# Patient Record
Sex: FEMALE | Race: WHITE | NOT HISPANIC OR LATINO | Employment: UNEMPLOYED | URBAN - METROPOLITAN AREA
[De-identification: names, ages, dates, MRNs, and addresses within clinical notes are randomized per-mention and may not be internally consistent; named-entity substitution may affect disease eponyms.]

---

## 2017-01-30 ENCOUNTER — OFFICE VISIT (OUTPATIENT)
Dept: INTERNAL MEDICINE | Facility: CLINIC | Age: 47
End: 2017-01-30
Payer: COMMERCIAL

## 2017-01-30 VITALS
DIASTOLIC BLOOD PRESSURE: 80 MMHG | HEIGHT: 63 IN | WEIGHT: 201.25 LBS | TEMPERATURE: 98 F | HEART RATE: 84 BPM | OXYGEN SATURATION: 100 % | SYSTOLIC BLOOD PRESSURE: 139 MMHG | RESPIRATION RATE: 16 BRPM | BODY MASS INDEX: 35.66 KG/M2

## 2017-01-30 DIAGNOSIS — J20.9 ACUTE BRONCHITIS, UNSPECIFIED ORGANISM: Primary | ICD-10-CM

## 2017-01-30 PROCEDURE — 99999 PR PBB SHADOW E&M-EST. PATIENT-LVL IV: CPT | Mod: PBBFAC,,, | Performed by: NURSE PRACTITIONER

## 2017-01-30 PROCEDURE — 1159F MED LIST DOCD IN RCRD: CPT | Mod: S$GLB,,, | Performed by: NURSE PRACTITIONER

## 2017-01-30 PROCEDURE — 99214 OFFICE O/P EST MOD 30 MIN: CPT | Mod: S$GLB,,, | Performed by: NURSE PRACTITIONER

## 2017-01-30 RX ORDER — ALBUTEROL SULFATE 90 UG/1
2 AEROSOL, METERED RESPIRATORY (INHALATION)
Qty: 18 G | Refills: 0 | Status: SHIPPED | OUTPATIENT
Start: 2017-01-30 | End: 2017-12-28 | Stop reason: ALTCHOICE

## 2017-01-30 RX ORDER — PROMETHAZINE HYDROCHLORIDE AND CODEINE PHOSPHATE 6.25; 1 MG/5ML; MG/5ML
5 SOLUTION ORAL EVERY 6 HOURS PRN
Qty: 200 ML | Refills: 0 | Status: SHIPPED | OUTPATIENT
Start: 2017-01-30 | End: 2017-02-09

## 2017-01-30 RX ORDER — PREDNISONE 20 MG/1
40 TABLET ORAL DAILY
Qty: 10 TABLET | Refills: 0 | Status: SHIPPED | OUTPATIENT
Start: 2017-01-30 | End: 2017-02-04

## 2017-01-30 NOTE — PATIENT INSTRUCTIONS
Bronchitis with Wheezing (Viral or Bacterial: Adult)    Bronchitis is an infection of the air passages. It often occurs during the common cold and is usually caused by a virus. Symptoms include cough with mucus (phlegm) and low-grade fever. This illness is contagious during the first few days and is spread through the air by coughing and sneezing, or by direct contact (touching the sick person and then touching your own eyes, nose, or mouth).  If there is a lot of inflammation, air flow is restricted. The air passages may also go into spasm, especially if you have asthma. This causes wheezing and difficulty breathing even in people who do not have asthma.  Bronchitis usually lasts 7 to 14 days. The wheezing should improve with treatment during the first week. An inhaler is often prescribed to relax the air passages and stop wheezing. Antibiotics will be prescribed if your doctor thinks there is also a secondary bacterial infection.  Home care  · If symptoms are severe, rest at home for the first 2 to 3 days. When you go back to your usual activities, don't let yourself get too tired.  · Do not smoke. Also avoid being exposed to secondhand smoke.  · You may use over-the-counter medicine to control fever or pain, unless another medicine was prescribed. Note: If you have chronic liver or kidney disease or have ever had a stomach ulcer or gastrointestinal bleeding, talk with your healthcare provider before using these medicines. Also talk to your provider if you are taking medicine to prevent blood clots.) Aspirin should never be given to anyone younger than 18 years of age who is ill with a viral infection or fever. It may cause severe liver or brain damage.  · Your appetite may be poor, so a light diet is fine. Avoid dehydration by drinking 6 to 8 glasses of fluids per day (such as water, soft drinks, sports drinks, juices, tea, or soup). Extra fluids will help loosen secretions in the nose and  lungs.  · Over-the-counter cough, cold, and sore-throat medicines will not shorten the length of the illness, but they may be helpful to reduce symptoms. (Note: Do not use decongestants if you have high blood pressure.)  · If you were given an inhaler, use it exactly as directed. If you need to use it more often than prescribed, your condition may be worsening. If this happens, contact your healthcare provider.  · If prescribed, finish all antibiotic medicine, even if you are feeling better after only a few days.  Follow-up care  Follow up with your healthcare provider, or as advised. If you had an X-ray or ECG (electrocardiogram), a specialist will review it. You will be notified of any new findings that may affect your care.  Note: If you are age 65 or older, or if you have a chronic lung disease or condition that affects your immune system, or you smoke, talk to your healthcare provider about having a pneumococcal vaccinations and a yearly influenza vaccination (flu shot).  When to seek medical advice  Call your healthcare provider right away if any of these occur:  · Fever of 100.4°F (38°C) or higher  · Coughing up increasing amounts of colored sputum  · Weakness, drowsiness, headache, facial pain, ear pain, or a stiff neck  Call 911, or get immediate medical care  Contact emergency services right away if any of these occur.  · Coughing up blood  · Worsening weakness, drowsiness, headache, or stiff neck  · Increased wheezing not helped with medication, shortness of breath, or pain with breathing  © 1474-6277 [employer]. [address] [place] [address], [place] [address]. All rights reserved. This information is not intended as a substitute for professional medical care. Always follow your healthcare professional's instructions.        Bronchitis, Viral (Adult)    You have a viral bronchitis. Bronchitis is inflammation and swelling of the lining of the lungs. This is often caused by an infection. Symptoms  include a dry, hacking cough that is worse at night. The cough may bring up yellow-green mucus. You may also feel short of breath or wheeze. Other symptoms may include tiredness, chest discomfort, and chills.  Bronchitis that is caused by a virus is not treated with antibiotics. Instead, medicines may be given to help relieve symptoms. Symptoms can last up to 2 weeks, although the cough may last much longer.  This illness is contagious during the first few days and is spread through the air by coughing and sneezing, or by direct contact (touching the sick person and then touching your own eyes, nose, or mouth).  Most viral illnesses resolve within 10 to 14 days with rest and simple home remedies, although they may sometimes last for several weeks.  Home care  · If symptoms are severe, rest at home for the first 2 to 3 days. When you go back to your usual activities, don't let yourself get too tired.  · Do not smoke. Also avoid being exposed to secondhand smoke.  · You may use over-the-counter medicine to control fever or pain, unless another pain medicine was prescribed. (Note: If you have chronic liver or kidney disease or have ever had a stomach ulcer or gastrointestinal bleeding, talk with your healthcare provider before using these medicines. Also talk to your provider if you are taking medicine to prevent blood clots.) Aspirin should never be given to anyone younger than 18 years of age who is ill with a viral infection or fever. It may cause severe liver or brain damage.  · Your appetite may be poor, so a light diet is fine. Avoid dehydration by drinking 6 to 8 glasses of fluids per day (such as water, soft drinks, sports drinks, juices, tea, or soup). Extra fluids will help loosen secretions in the nose and lungs.  · Over-the-counter cough, cold, and sore-throat medicines will not shorten the length of the illness, but they may help to reduce symptoms. (Note: Do not use decongestants if you have high blood  pressure.)  Follow-up care  Follow up with your healthcare provider, or as advised. If you had an X-ray or ECG (electrocardiogram), a specialist will review it. You will be notified of any new findings that may affect your care.  Note: If you are age 65 or older, or if you have a chronic lung disease or condition that affects your immune system, or you smoke, talk to your healthcare provider about having pneumococcal vaccinations and a yearly influenza vaccination (flu shot).  When to seek medical advice  Call your healthcare provider right away if any of these occur:  · Fever of 100.4°F (38°C) or higher  · Coughing up increased amounts of colored sputum  · Weakness, drowsiness, headache, facial pain, ear pain, or a stiff neck  Call 911, or get immediate medical care  Contact emergency services right away if any of these occur:  · Coughing up blood  · Worsening weakness, drowsiness, headache, or stiff neck  · Trouble breathing, wheezing, or pain with breathing  © 9232-5759 [employer]. [address] [place] [address], [place] [address]. All rights reserved. This information is not intended as a substitute for professional medical care. Always follow your healthcare professional's instructions.

## 2017-01-30 NOTE — MR AVS SNAPSHOT
[facility]  [address] [address]  [place] [address]  Phone: [phone]                  Jael Olguin   2017 11:20 AM   Office Visit    Description:  Female : 1970   Provider:  FLAVIO Medel,TONJA-C   Department:  [facility]           Reason for Visit     Cough     Nasal Congestion     Shortness of Breath           Diagnoses this Visit        Comments    Acute bronchitis, unspecified organism    -  Primary            To Do List           Goals (5 Years of Data)     None       These Medications        Disp Refills Start End    predniSONE (DELTASONE) 20 MG tablet 10 tablet 0 2017    Take 2 tablets (40 mg total) by mouth once daily. - Oral    Pharmacy: [address] [id number] [place] Ph #: [phone]       albuterol 90 mcg/actuation inhaler 18 g 0 2017    Inhale 2 puffs into the lungs every 4 to 6 hours as needed for Wheezing. - Inhalation    Pharmacy: [address] [id number] Ph #: [phone]       promethazine-codeine 6.25-10 mg/5 ml (PHENERGAN WITH CODEINE) 6.25-10 mg/5 mL syrup 200 mL 0 2017    Take 5 mLs by mouth every 6 (six) hours as needed for Cough. - Oral    Pharmacy: [address] [address] [place] Ph #: [phone]         [facility]     [facility] -  Assistance  Registered nurses in the [facility] provide clinical advisement, health education, appointment booking, and other advisory services.  Call for this free service at [phone].             Medications           Message regarding Medications     Verify the changes and/or additions to your medication regime listed below are the same as discussed with your clinician today.  If any of these changes or additions are incorrect, please notify your healthcare provider.       "  START taking these NEW medications        Refills    predniSONE (DELTASONE) 20 MG tablet 0    Sig: Take 2 tablets (40 mg total) by mouth once daily.    Class: Normal    Route: Oral    albuterol 90 mcg/actuation inhaler 0    Sig: Inhale 2 puffs into the lungs every 4 to 6 hours as needed for Wheezing.    Class: Normal    Route: Inhalation    promethazine-codeine 6.25-10 mg/5 ml (PHENERGAN WITH CODEINE) 6.25-10 mg/5 mL syrup 0    Sig: Take 5 mLs by mouth every 6 (six) hours as needed for Cough.    Class: Print    Route: Oral      STOP taking these medications     ibuprofen (ADVIL,MOTRIN) 600 MG tablet Take 1 tablet (600 mg total) by mouth every 6 (six) hours as needed for Pain or Temperature greater than (100.4 F).    diclofenac (VOLTAREN) 75 MG EC tablet Take 1 tablet (75 mg total) by mouth 2 (two) times daily.    hydrocodone-acetaminophen 5-325mg (NORCO) 5-325 mg per tablet Take 1 tablet by mouth every 4 (four) hours as needed for Pain.           Verify that the below list of medications is an accurate representation of the medications you are currently taking.  If none reported, the list may be blank. If incorrect, please contact your healthcare provider. Carry this list with you in case of emergency.           Current Medications     albuterol 90 mcg/actuation inhaler Inhale 2 puffs into the lungs every 4 to 6 hours as needed for Wheezing.    predniSONE (DELTASONE) 20 MG tablet Take 2 tablets (40 mg total) by mouth once daily.    promethazine-codeine 6.25-10 mg/5 ml (PHENERGAN WITH CODEINE) 6.25-10 mg/5 mL syrup Take 5 mLs by mouth every 6 (six) hours as needed for Cough.           Clinical Reference Information           Vital Signs - Last Recorded  Most recent update: 1/30/2017 11:30 AM by Alfred Landin MA    BP Pulse Temp Resp    139/80 (BP Location: Right arm, Patient Position: Sitting, BP Method: Automatic) 84 97.5 °F (36.4 °C) (Tympanic) 16    Ht Wt SpO2 BMI    5' 3" (1.6 m) 91.3 kg (201 lb 4.5 oz) " 100% 35.66 kg/m2      Blood Pressure          Most Recent Value    BP  139/80      Allergies as of 1/30/2017     No Known Allergies      Immunizations Administered on Date of Encounter - 1/30/2017     None      Instructions      Bronchitis with Wheezing (Viral or Bacterial: Adult)    Bronchitis is an infection of the air passages. It often occurs during the common cold and is usually caused by a virus. Symptoms include cough with mucus (phlegm) and low-grade fever. This illness is contagious during the first few days and is spread through the air by coughing and sneezing, or by direct contact (touching the sick person and then touching your own eyes, nose, or mouth).  If there is a lot of inflammation, air flow is restricted. The air passages may also go into spasm, especially if you have asthma. This causes wheezing and difficulty breathing even in people who do not have asthma.  Bronchitis usually lasts 7 to 14 days. The wheezing should improve with treatment during the first week. An inhaler is often prescribed to relax the air passages and stop wheezing. Antibiotics will be prescribed if your doctor thinks there is also a secondary bacterial infection.  Home care  · If symptoms are severe, rest at home for the first 2 to 3 days. When you go back to your usual activities, don't let yourself get too tired.  · Do not smoke. Also avoid being exposed to secondhand smoke.  · You may use over-the-counter medicine to control fever or pain, unless another medicine was prescribed. Note: If you have chronic liver or kidney disease or have ever had a stomach ulcer or gastrointestinal bleeding, talk with your healthcare provider before using these medicines. Also talk to your provider if you are taking medicine to prevent blood clots.) Aspirin should never be given to anyone younger than 18 years of age who is ill with a viral infection or fever. It may cause severe liver or brain damage.  · Your appetite may be poor, so a  light diet is fine. Avoid dehydration by drinking 6 to 8 glasses of fluids per day (such as water, soft drinks, sports drinks, juices, tea, or soup). Extra fluids will help loosen secretions in the nose and lungs.  · Over-the-counter cough, cold, and sore-throat medicines will not shorten the length of the illness, but they may be helpful to reduce symptoms. (Note: Do not use decongestants if you have high blood pressure.)  · If you were given an inhaler, use it exactly as directed. If you need to use it more often than prescribed, your condition may be worsening. If this happens, contact your healthcare provider.  · If prescribed, finish all antibiotic medicine, even if you are feeling better after only a few days.  Follow-up care  Follow up with your healthcare provider, or as advised. If you had an X-ray or ECG (electrocardiogram), a specialist will review it. You will be notified of any new findings that may affect your care.  Note: If you are age 65 or older, or if you have a chronic lung disease or condition that affects your immune system, or you smoke, talk to your healthcare provider about having a pneumococcal vaccinations and a yearly influenza vaccination (flu shot).  When to seek medical advice  Call your healthcare provider right away if any of these occur:  · Fever of 100.4°F (38°C) or higher  · Coughing up increasing amounts of colored sputum  · Weakness, drowsiness, headache, facial pain, ear pain, or a stiff neck  Call 911, or get immediate medical care  Contact emergency services right away if any of these occur.  · Coughing up blood  · Worsening weakness, drowsiness, headache, or stiff neck  · Increased wheezing not helped with medication, shortness of breath, or pain with breathing  © 2687-0404 [employer]. [address] [place] [address], [place] [address]. All rights reserved. This information is not intended as a substitute for professional medical care. Always follow your healthcare  professional's instructions.        Bronchitis, Viral (Adult)    You have a viral bronchitis. Bronchitis is inflammation and swelling of the lining of the lungs. This is often caused by an infection. Symptoms include a dry, hacking cough that is worse at night. The cough may bring up yellow-green mucus. You may also feel short of breath or wheeze. Other symptoms may include tiredness, chest discomfort, and chills.  Bronchitis that is caused by a virus is not treated with antibiotics. Instead, medicines may be given to help relieve symptoms. Symptoms can last up to 2 weeks, although the cough may last much longer.  This illness is contagious during the first few days and is spread through the air by coughing and sneezing, or by direct contact (touching the sick person and then touching your own eyes, nose, or mouth).  Most viral illnesses resolve within 10 to 14 days with rest and simple home remedies, although they may sometimes last for several weeks.  Home care  · If symptoms are severe, rest at home for the first 2 to 3 days. When you go back to your usual activities, don't let yourself get too tired.  · Do not smoke. Also avoid being exposed to secondhand smoke.  · You may use over-the-counter medicine to control fever or pain, unless another pain medicine was prescribed. (Note: If you have chronic liver or kidney disease or have ever had a stomach ulcer or gastrointestinal bleeding, talk with your healthcare provider before using these medicines. Also talk to your provider if you are taking medicine to prevent blood clots.) Aspirin should never be given to anyone younger than 18 years of age who is ill with a viral infection or fever. It may cause severe liver or brain damage.  · Your appetite may be poor, so a light diet is fine. Avoid dehydration by drinking 6 to 8 glasses of fluids per day (such as water, soft drinks, sports drinks, juices, tea, or soup). Extra fluids will help loosen secretions in the nose  and lungs.  · Over-the-counter cough, cold, and sore-throat medicines will not shorten the length of the illness, but they may help to reduce symptoms. (Note: Do not use decongestants if you have high blood pressure.)  Follow-up care  Follow up with your healthcare provider, or as advised. If you had an X-ray or ECG (electrocardiogram), a specialist will review it. You will be notified of any new findings that may affect your care.  Note: If you are age 65 or older, or if you have a chronic lung disease or condition that affects your immune system, or you smoke, talk to your healthcare provider about having pneumococcal vaccinations and a yearly influenza vaccination (flu shot).  When to seek medical advice  Call your healthcare provider right away if any of these occur:  · Fever of 100.4°F (38°C) or higher  · Coughing up increased amounts of colored sputum  · Weakness, drowsiness, headache, facial pain, ear pain, or a stiff neck  Call 911, or get immediate medical care  Contact emergency services right away if any of these occur:  · Coughing up blood  · Worsening weakness, drowsiness, headache, or stiff neck  · Trouble breathing, wheezing, or pain with breathing  © 6818-9804 [employer]. [address] [place] [address], [place] [address]. All rights reserved. This information is not intended as a substitute for professional medical care. Always follow your healthcare professional's instructions.             Smoking Cessation     If you would like to quit smoking:   You may be eligible for free services if you are a [place] resident and started smoking cigarettes before September 1, 1988.  Call the Smoking Cessation Trust (SCT) toll free at (503) 329-7714 or (940) 476-7645.   Call 2-800-QUIT-NOW if you do not meet the above criteria.

## 2017-01-30 NOTE — LETTER
January 30, 2017                 [facility]  Internal Medicine  [address] [place] [id number]  Phone: [phone]   January 30, 2017     Patient: Jael Olguin   YOB: 1970   Date of Visit: 1/30/2017       To Whom it May Concern:    Jael Olguin was seen in my clinic on 1/30/2017. She may return to work on 2/1/2017.    If you have any questions or concerns, please don't hesitate to call.    Sincerely,         FLAVIO Medel,FNP-C

## 2017-01-30 NOTE — PROGRESS NOTES
Subjective:       Patient ID: Jael Olguin is a 46 y.o. female.    Chief Complaint: Cough; Nasal Congestion; and Shortness of Breath    Cough   This is a new problem. The current episode started yesterday. The problem has been rapidly worsening. The cough is non-productive. Associated symptoms include headaches (frontal), myalgias, nasal congestion, postnasal drip, rhinorrhea, shortness of breath and wheezing. Pertinent negatives include no chest pain, chills, ear congestion, ear pain, fever, rash, sore throat or sweats. Nothing aggravates the symptoms. Treatments tried: mucinex multipsymptom. The treatment provided mild relief. Her past medical history is significant for asthma and bronchitis. There is no history of COPD.     Review of Systems   Constitutional: Positive for appetite change. Negative for activity change, chills, diaphoresis, fatigue and fever.   HENT: Positive for congestion, postnasal drip and rhinorrhea. Negative for ear discharge, ear pain, sinus pressure, sneezing and sore throat.    Respiratory: Positive for cough, shortness of breath and wheezing.    Cardiovascular: Negative for chest pain and palpitations.   Gastrointestinal: Negative for abdominal pain, diarrhea, nausea and vomiting.   Musculoskeletal: Positive for myalgias.   Skin: Negative for rash.   Neurological: Positive for headaches (frontal). Negative for dizziness, weakness and light-headedness.   Hematological: Negative for adenopathy.       Objective:      Physical Exam   Constitutional: She is oriented to person, place, and time. She appears well-developed and well-nourished.   HENT:   Head: Normocephalic and atraumatic.   Right Ear: Hearing, tympanic membrane, external ear and ear canal normal.   Left Ear: Hearing, tympanic membrane, external ear and ear canal normal.   Nose: Mucosal edema and rhinorrhea present. Right sinus exhibits no maxillary sinus tenderness and no frontal sinus tenderness. Left sinus  exhibits no maxillary sinus tenderness and no frontal sinus tenderness.   Mouth/Throat: Uvula is midline. Posterior oropharyngeal erythema (mild) present. No oropharyngeal exudate or posterior oropharyngeal edema. No tonsillar exudate.   Eyes: Conjunctivae and EOM are normal. Pupils are equal, round, and reactive to light. Right eye exhibits no discharge. Left eye exhibits no discharge.   Neck: Normal range of motion. Neck supple.   Cardiovascular: Normal rate, regular rhythm, normal heart sounds and intact distal pulses.    Pulmonary/Chest: Effort normal. She has no decreased breath sounds. She has wheezes (intermittent, expiratory).   Abdominal: Soft. Bowel sounds are normal. She exhibits no distension. There is no tenderness.   Lymphadenopathy:     She has no cervical adenopathy.   Neurological: She is alert and oriented to person, place, and time.   Skin: Skin is warm and dry.   Psychiatric: She has a normal mood and affect. Her behavior is normal. Judgment and thought content normal.   Vitals reviewed.      Assessment:       1. Acute bronchitis, unspecified organism        Plan:       *Acute bronchitis, unspecified organism  Discussed supportive home care including rest, hydration, hot fluids with honey and tylenol/motrin for sore throat and body aches. Handout given. Pt verbalizes understanding.  F/u if not improving over the next 5 days. Will consider ABX at that time  -     predniSONE (DELTASONE) 20 MG tablet; Take 2 tablets (40 mg total) by mouth once daily.  Dispense: 10 tablet; Refill: 0  -     albuterol 90 mcg/actuation inhaler; Inhale 2 puffs into the lungs every 4 to 6 hours as needed for Wheezing.  Dispense: 18 g; Refill: 0  -     promethazine-codeine 6.25-10 mg/5 ml (PHENERGAN WITH CODEINE) 6.25-10 mg/5 mL syrup; Take 5 mLs by mouth every 6 (six) hours as needed for Cough.  Dispense: 200 mL; Refill: 0    **

## 2017-04-15 ENCOUNTER — HOSPITAL ENCOUNTER (EMERGENCY)
Facility: HOSPITAL | Age: 47
Discharge: HOME | End: 2017-04-15
Payer: COMMERCIAL

## 2017-04-15 VITALS
HEART RATE: 102 BPM | RESPIRATION RATE: 16 BRPM | TEMPERATURE: 98 F | WEIGHT: 190 LBS | BODY MASS INDEX: 32.44 KG/M2 | HEIGHT: 64 IN | DIASTOLIC BLOOD PRESSURE: 76 MMHG | OXYGEN SATURATION: 99 % | SYSTOLIC BLOOD PRESSURE: 145 MMHG

## 2017-04-15 DIAGNOSIS — M54.50 CHRONIC MIDLINE LOW BACK PAIN WITHOUT SCIATICA: Primary | ICD-10-CM

## 2017-04-15 DIAGNOSIS — G89.29 CHRONIC MIDLINE LOW BACK PAIN WITHOUT SCIATICA: Primary | ICD-10-CM

## 2017-04-15 PROCEDURE — 99283 EMERGENCY DEPT VISIT LOW MDM: CPT

## 2017-04-15 RX ORDER — HYDROCODONE BITARTRATE AND ACETAMINOPHEN 5; 325 MG/1; MG/1
1 TABLET ORAL EVERY 4 HOURS PRN
Qty: 15 TABLET | Refills: 0 | Status: SHIPPED | OUTPATIENT
Start: 2017-04-15 | End: 2017-05-23

## 2017-04-15 RX ORDER — CYCLOBENZAPRINE HCL 10 MG
10 TABLET ORAL 3 TIMES DAILY PRN
Qty: 15 TABLET | Refills: 0 | Status: SHIPPED | OUTPATIENT
Start: 2017-04-15 | End: 2017-04-20

## 2017-04-15 NOTE — ED PROVIDER NOTES
Encounter Date: 4/15/2017       History     Chief Complaint   Patient presents with    Back Pain     lower back pain since yesterday, denies injury/trauma, denies urinary/bowel problems      Review of patient's allergies indicates:  No Known Allergies  HPI Comments: Pt reports to ED c/o low back pain.  Pt says she has had chronic back pain, but says she had an acute flare that started yesterday.  Pt says she has a hx of bulging disk.  Pt says pain is to her midback and feels like someone is punching her in the back. Pt reports no relief with with motrin 800.  Pt says pain is worse with leaning back and laying down. Pain is constant and does not radiate.  Pt denies any trauma, saddle anesthesia, bladder or bowel incontinence, fever, nausea, vomiting, numbness, tingling, or any other associated sxs.      The history is provided by the patient.     Past Medical History:   Diagnosis Date    Depression      Past Surgical History:   Procedure Laterality Date    NO PAST SURGERIES       History reviewed. No pertinent family history.  Social History   Substance Use Topics    Smoking status: Current Every Day Smoker     Packs/day: 0.50     Years: 40.00     Types: Cigarettes    Smokeless tobacco: Never Used    Alcohol use No     Review of Systems   Constitutional: Negative for fever.   HENT: Negative for sore throat.    Respiratory: Negative for shortness of breath.    Cardiovascular: Negative for chest pain.   Gastrointestinal: Negative for diarrhea, nausea and vomiting.   Genitourinary: Negative for dysuria and hematuria.   Musculoskeletal: Positive for back pain. Negative for gait problem and joint swelling.   Skin: Negative for rash and wound.   Neurological: Negative for weakness.   Hematological: Does not bruise/bleed easily.   All other systems reviewed and are negative.      Physical Exam   Initial Vitals   BP Pulse Resp Temp SpO2   04/15/17 1629 04/15/17 1629 04/15/17 1629 04/15/17 1629 04/15/17 1629   145/76  102 16 98 °F (36.7 °C) 99 %     Physical Exam    Nursing note and vitals reviewed.  Constitutional: She appears well-developed and well-nourished.   HENT:   Head: Normocephalic and atraumatic.   Eyes: EOM are normal. Pupils are equal, round, and reactive to light.   Neck: Normal range of motion. Neck supple.   Cardiovascular: Normal rate and regular rhythm.   Pulmonary/Chest: Breath sounds normal.   Abdominal: Soft.   Musculoskeletal: Normal range of motion. She exhibits tenderness (diffuse low back tenderness). She exhibits no edema.   No bony deformities or step-offs   Neurological: She is alert and oriented to person, place, and time. She displays normal reflexes. No sensory deficit.         ED Course   Procedures  Labs Reviewed - No data to display                            ED Course     Clinical Impression:   The encounter diagnosis was Chronic midline low back pain without sciatica.          XAVIER Rowell  04/15/17 4383

## 2017-04-15 NOTE — ED AVS SNAPSHOT
[facility]  [address] [address] [address]               Jael Olguin   4/15/2017  4:34 PM   ED    Description:  Female : 1970   Department:  [facility]           Your Care was Coordinated By:     Provider Role From To    XAVIER Rowell Physician Assistant 04/15/17 8174 --      Reason for Visit     Back Pain           Diagnoses this Visit        Comments    Chronic midline low back pain without sciatica    -  Primary       ED Disposition     None           To Do List           Follow-up Information     Follow up with Franck Pham MD In 2 days.    Specialty:  Pain Medicine    Why:  As needed, If symptoms worsen return to ED     Contact information:    [address] [facility] [address]         These Medications        Disp Refills Start End    hydrocodone-acetaminophen 5-325mg (NORCO) 5-325 mg per tablet 15 tablet 0 4/15/2017     Take 1 tablet by mouth every 4 (four) hours as needed for Pain. - Oral    Pharmacy: [facility] - [place], [place] - [address] Ph #: [phone]       cyclobenzaprine (FLEXERIL) 10 MG tablet 15 tablet 0 4/15/2017 2017    Take 1 tablet (10 mg total) by mouth 3 (three) times daily as needed for Muscle spasms. - Oral    Pharmacy: [facility] - [place], [place] - [address] Ph #: [phone]         [facility]     [facility] Care [facility] -  Assistance  Unless otherwise directed by your provider, please contact [facility], our nurse care line that is available for  assistance.     Registered nurses in the [facility] provide: appointment scheduling, clinical advisement, health education, and other advisory services.  Call: [phone] (toll free)               Medications           Message regarding Medications     Verify the changes and/or additions to your medication regime  "listed below are the same as discussed with your clinician today.  If any of these changes or additions are incorrect, please notify your healthcare provider.        START taking these NEW medications        Refills    hydrocodone-acetaminophen 5-325mg (NORCO) 5-325 mg per tablet 0    Sig: Take 1 tablet by mouth every 4 (four) hours as needed for Pain.    Class: Print    Route: Oral    cyclobenzaprine (FLEXERIL) 10 MG tablet 0    Sig: Take 1 tablet (10 mg total) by mouth 3 (three) times daily as needed for Muscle spasms.    Class: Print    Route: Oral           Verify that the below list of medications is an accurate representation of the medications you are currently taking.  If none reported, the list may be blank. If incorrect, please contact your healthcare provider. Carry this list with you in case of emergency.           Current Medications     albuterol 90 mcg/actuation inhaler Inhale 2 puffs into the lungs every 4 to 6 hours as needed for Wheezing.    cyclobenzaprine (FLEXERIL) 10 MG tablet Take 1 tablet (10 mg total) by mouth 3 (three) times daily as needed for Muscle spasms.    hydrocodone-acetaminophen 5-325mg (NORCO) 5-325 mg per tablet Take 1 tablet by mouth every 4 (four) hours as needed for Pain.           Clinical Reference Information           Your Vitals Were     BP Pulse Temp Resp Height Weight    145/76 (BP Location: Left arm, Patient Position: Sitting) 102 98 °F (36.7 °C) (Oral) 16 5' 4" (1.626 m) 86.2 kg (190 lb)    Last Period SpO2 BMI          04/07/2017 99% 32.61 kg/m2        Allergies as of 4/15/2017     No Known Allergies      Immunizations Administered on Date of Encounter - 4/15/2017     None      ED Micro, Lab, POCT     None      ED Imaging Orders     None        Discharge Instructions         Back Care Tips    Caring for your back  These are things you can do to prevent a recurrence of acute back pain and to reduce symptoms from chronic back pain:  · Maintain a healthy weight. If you " are overweight, losing weight will help most types of back pain.  · Exercise is an important part of recovery from most types of back pain. The muscles behind and in front of the spine support the back. This means strengthening both the back muscles and the abdominal muscles will provide better support for your spine.   · Swimming and brisk walking are good overall exercises to improve your fitness level.  · Practice safe lifting methods (below).  · Practice good posture when sitting, standing and walking. Avoid prolonged sitting. This puts more stress on the lower back than standing or walking.  · Wear quality shoes with sufficient arch support. Foot and ankle alignment can affect back symptoms. Women should avoid wearing high heels.  · Therapeutic massage can help relax the back muscles without stretching them.  · During the first 24 to 72 hours after an acute injury or flare-up of chronic back pain, apply an ice pack to the painful area for 20 minutes and then remove it for 20 minutes, over a period of 60 to 90 minutes, or several times a day. As a safety precaution, do not use a heating pad at bedtime. Sleeping on a heating pad can lead to skin burns or tissue damage.  · You can alternate ice and heat therapies.  Medications  Talk to your healthcare provider before using medicines, especially if you have other medical problems or are taking other medicines.  · You may use acetaminophen or ibuprofen to control pain, unless your healthcare provider prescribed other pain medicine. If you have chronic conditions like diabetes, liver or kidney disease, stomach ulcers, or gastrointestinal bleeding, or are taking blood thinners, talk with your healthcare provider before taking any medicines.  · Be careful if you are given prescription pain medicines, narcotics, or medicine for muscle spasm. They can cause drowsiness, affect your coordination, reflexes, and judgment. Do not drive or operate heavy machinery while taking  these types of medicines. Take prescription pain medicine only as prescribed by your healthcare provider.  Lumbar stretch  Here is a simple stretching exercise that will help relax muscle spasm and keep your back more limber. If exercise makes your back pain worse, dont do it.  · Lie on your back with your knees bent and both feet on the ground.  · Slowly raise your left knee to your chest as you flatten your lower back against the floor. Hold for 5 seconds.  · Relax and repeat the exercise with your right knee.  · Do 10 of these exercises for each leg.  Safe lifting method  · Dont bend over at the waist to lift an object off the floor.  Instead, bend your knees and hips in a squat.   · Keep your back and head upright  · Hold the object close to your body, directly in front of you.  · Straighten your legs to lift the object.   · Lower the object to the floor in the reverse fashion.  · If you must slide something across the floor, push it.  Posture tips  Sitting  Sit in chairs with straight backs or low-back support. Keep your knees lower than your hips, with your feet flat on the floor.  When driving, sit up straight. Adjust the seat forward so you are not leaning toward the steering wheel.  A small pillow or rolled towel behind your lower back may help if you are driving long distances.   Standing  When standing for long periods, shift most of your weight to one leg at a time. Alternate legs every few minutes.   Sleeping  The best way to sleep is on your side with your knees bent. Put a low pillow under your head to support your neck in a neutral spine position. Avoid thick pillows that bend your neck to one side. Put a pillow between your legs to further relax your lower back. If you sleep on your back, put pillows under your knees to support your legs in a slightly flexed position. Use a firm mattress. If your mattress sags, replace it, or use a 1/2-inch plywood board under the mattress to add  support.  Follow-up care  Follow up with your healthcare provider, or as advised.  If X-rays, a CT scan or an MRI scan were taken, they will be reviewed by a radiologist. You will be notified of any new findings that may affect your care.  Call 911  Seek emergency medical care if any of the following occur:  · Trouble breathing  · Confusion  · Very drowsy  · Fainting or loss of consciousness  · Rapid or very slow heart rate  · Loss of  bowel or bladder control  When to seek medical care  Call your healthcare provider if any of the following occur:  · Pain becomes worse or spreads to your arms or legs  · Weakness or numbness in one or both arms or legs  · Numbness in the groin area  Date Last Reviewed: 6/1/2016 © 2000-2016 [employer]. [address] [place] [address]. All rights reserved. This information is not intended as a substitute for professional medical care. Always follow your healthcare professional's instructions.          Smoking Cessation     If you would like to quit smoking:   You may be eligible for free services if you are a [place] resident and started smoking cigarettes before September 1, 1988.  Call the Smoking Cessation Trust (SCT) toll free at (453) 249-9668 or (164) 894-0670.   Call 1-800-QUIT-NOW if you do not meet the above criteria.   Contact us via email: [email or web address]   View our website for more information: [email or web address]         [facility] complies with applicable Federal civil rights laws and does not discriminate on the basis of race, color, national origin, age, disability, or sex.        Language Assistance Services     ATTENTION: Language assistance services are available, free of charge. Please call [phone].      ATENCIÓN: Si habla español, tiene a moraes disposición servicios gratuitos de asistencia lingüística. Llame al [phone].     CHÚ Ý: N?u b?n nói Ti?ng Vi?t, có các d?ch v? h? tr? ngôn ng?  mi?n phí dành cho b?n. G?i s? [phone].

## 2017-04-15 NOTE — DISCHARGE INSTRUCTIONS
Back Care Tips    Caring for your back  These are things you can do to prevent a recurrence of acute back pain and to reduce symptoms from chronic back pain:  · Maintain a healthy weight. If you are overweight, losing weight will help most types of back pain.  · Exercise is an important part of recovery from most types of back pain. The muscles behind and in front of the spine support the back. This means strengthening both the back muscles and the abdominal muscles will provide better support for your spine.   · Swimming and brisk walking are good overall exercises to improve your fitness level.  · Practice safe lifting methods (below).  · Practice good posture when sitting, standing and walking. Avoid prolonged sitting. This puts more stress on the lower back than standing or walking.  · Wear quality shoes with sufficient arch support. Foot and ankle alignment can affect back symptoms. Women should avoid wearing high heels.  · Therapeutic massage can help relax the back muscles without stretching them.  · During the first 24 to 72 hours after an acute injury or flare-up of chronic back pain, apply an ice pack to the painful area for 20 minutes and then remove it for 20 minutes, over a period of 60 to 90 minutes, or several times a day. As a safety precaution, do not use a heating pad at bedtime. Sleeping on a heating pad can lead to skin burns or tissue damage.  · You can alternate ice and heat therapies.  Medications  Talk to your healthcare provider before using medicines, especially if you have other medical problems or are taking other medicines.  · You may use acetaminophen or ibuprofen to control pain, unless your healthcare provider prescribed other pain medicine. If you have chronic conditions like diabetes, liver or kidney disease, stomach ulcers, or gastrointestinal bleeding, or are taking blood thinners, talk with your healthcare provider before taking any medicines.  · Be careful if you are given  prescription pain medicines, narcotics, or medicine for muscle spasm. They can cause drowsiness, affect your coordination, reflexes, and judgment. Do not drive or operate heavy machinery while taking these types of medicines. Take prescription pain medicine only as prescribed by your healthcare provider.  Lumbar stretch  Here is a simple stretching exercise that will help relax muscle spasm and keep your back more limber. If exercise makes your back pain worse, dont do it.  · Lie on your back with your knees bent and both feet on the ground.  · Slowly raise your left knee to your chest as you flatten your lower back against the floor. Hold for 5 seconds.  · Relax and repeat the exercise with your right knee.  · Do 10 of these exercises for each leg.  Safe lifting method  · Dont bend over at the waist to lift an object off the floor.  Instead, bend your knees and hips in a squat.   · Keep your back and head upright  · Hold the object close to your body, directly in front of you.  · Straighten your legs to lift the object.   · Lower the object to the floor in the reverse fashion.  · If you must slide something across the floor, push it.  Posture tips  Sitting  Sit in chairs with straight backs or low-back support. Keep your knees lower than your hips, with your feet flat on the floor.  When driving, sit up straight. Adjust the seat forward so you are not leaning toward the steering wheel.  A small pillow or rolled towel behind your lower back may help if you are driving long distances.   Standing  When standing for long periods, shift most of your weight to one leg at a time. Alternate legs every few minutes.   Sleeping  The best way to sleep is on your side with your knees bent. Put a low pillow under your head to support your neck in a neutral spine position. Avoid thick pillows that bend your neck to one side. Put a pillow between your legs to further relax your lower back. If you sleep on your back, put pillows  under your knees to support your legs in a slightly flexed position. Use a firm mattress. If your mattress sags, replace it, or use a 1/2-inch plywood board under the mattress to add support.  Follow-up care  Follow up with your healthcare provider, or as advised.  If X-rays, a CT scan or an MRI scan were taken, they will be reviewed by a radiologist. You will be notified of any new findings that may affect your care.  Call 911  Seek emergency medical care if any of the following occur:  · Trouble breathing  · Confusion  · Very drowsy  · Fainting or loss of consciousness  · Rapid or very slow heart rate  · Loss of  bowel or bladder control  When to seek medical care  Call your healthcare provider if any of the following occur:  · Pain becomes worse or spreads to your arms or legs  · Weakness or numbness in one or both arms or legs  · Numbness in the groin area  Date Last Reviewed: 6/1/2016  © 1145-6011 [employer]. [address] [place] [address], [place] [address]. All rights reserved. This information is not intended as a substitute for professional medical care. Always follow your healthcare professional's instructions.

## 2017-04-15 NOTE — ED NOTES
Pt stable, in NAD, and states no further needs at this time. PA aware of vitals ok to d/c.  Pt to be d/c'd home.

## 2017-05-23 ENCOUNTER — OFFICE VISIT (OUTPATIENT)
Dept: INTERNAL MEDICINE | Facility: CLINIC | Age: 47
End: 2017-05-23
Payer: COMMERCIAL

## 2017-05-23 VITALS
HEIGHT: 64 IN | RESPIRATION RATE: 16 BRPM | DIASTOLIC BLOOD PRESSURE: 71 MMHG | HEART RATE: 81 BPM | WEIGHT: 183.63 LBS | SYSTOLIC BLOOD PRESSURE: 114 MMHG | BODY MASS INDEX: 31.35 KG/M2 | TEMPERATURE: 98 F | OXYGEN SATURATION: 99 %

## 2017-05-23 DIAGNOSIS — A08.4 GASTROENTERITIS AND COLITIS, VIRAL: Primary | ICD-10-CM

## 2017-05-23 DIAGNOSIS — Z12.31 ENCOUNTER FOR SCREENING MAMMOGRAM FOR BREAST CANCER: ICD-10-CM

## 2017-05-23 PROCEDURE — 1160F RVW MEDS BY RX/DR IN RCRD: CPT | Mod: S$GLB,,, | Performed by: FAMILY MEDICINE

## 2017-05-23 PROCEDURE — 99999 PR PBB SHADOW E&M-EST. PATIENT-LVL III: CPT | Mod: PBBFAC,,, | Performed by: FAMILY MEDICINE

## 2017-05-23 PROCEDURE — 99214 OFFICE O/P EST MOD 30 MIN: CPT | Mod: S$GLB,,, | Performed by: FAMILY MEDICINE

## 2017-05-23 RX ORDER — CYCLOBENZAPRINE HCL 10 MG
10 TABLET ORAL 3 TIMES DAILY PRN
Qty: 30 TABLET | Refills: 1 | Status: SHIPPED | OUTPATIENT
Start: 2017-05-23 | End: 2017-06-02

## 2017-05-23 RX ORDER — NAPROXEN 500 MG/1
500 TABLET ORAL 2 TIMES DAILY
Qty: 14 TABLET | Refills: 1 | Status: SHIPPED | OUTPATIENT
Start: 2017-05-23 | End: 2017-05-30

## 2017-05-23 NOTE — PATIENT INSTRUCTIONS

## 2017-05-23 NOTE — PROGRESS NOTES
"Subjective:       Patient ID: Jael Olguin is a 46 y.o. female.    Chief Complaint: Nausea; Diarrhea; Low-back Pain; and Hip Pain (rosy)    Nausea   This is a new problem. The current episode started in the past 7 days. The problem has been gradually improving. Associated symptoms include arthralgias, a change in bowel habit, nausea and vomiting. Pertinent negatives include no chest pain, congestion, coughing, diaphoresis, fever, rash or urinary symptoms. The symptoms are aggravated by eating. Treatments tried: imodium.   Diarrhea    This is a new problem. The current episode started in the past 7 days. The problem has been gradually improving. Associated symptoms include arthralgias and vomiting. Pertinent negatives include no coughing or fever.       History reviewed. No pertinent family history.    Current Outpatient Prescriptions:     albuterol 90 mcg/actuation inhaler, Inhale 2 puffs into the lungs every 4 to 6 hours as needed for Wheezing., Disp: 18 g, Rfl: 0    cyclobenzaprine (FLEXERIL) 10 MG tablet, Take 1 tablet (10 mg total) by mouth 3 (three) times daily as needed for Muscle spasms., Disp: 30 tablet, Rfl: 1    naproxen (EC NAPROSYN) 500 MG EC tablet, Take 1 tablet (500 mg total) by mouth 2 (two) times daily., Disp: 14 tablet, Rfl: 1    Review of Systems   Constitutional: Negative for diaphoresis and fever.   HENT: Negative for congestion.    Respiratory: Negative for cough.    Cardiovascular: Negative for chest pain.   Gastrointestinal: Positive for change in bowel habit, diarrhea, nausea and vomiting.   Musculoskeletal: Positive for arthralgias and back pain (SI joint area).   Skin: Negative for rash.       Objective:   /71 (BP Location: Left arm, Patient Position: Sitting, BP Method: Automatic)   Pulse 81   Temp 97.8 °F (36.6 °C) (Tympanic)   Resp 16   Ht 5' 4" (1.626 m)   Wt 83.3 kg (183 lb 10.3 oz)   LMP 05/03/2017   SpO2 99%   BMI 31.52 kg/m²      Physical Exam "   Constitutional: She is oriented to person, place, and time. She appears well-developed and well-nourished.   HENT:   Head: Normocephalic and atraumatic.   Eyes: Conjunctivae are normal.   Cardiovascular: Normal rate.    Pulmonary/Chest: Effort normal. No respiratory distress.   Musculoskeletal: She exhibits no edema.        Back:    Neurological: She is alert and oriented to person, place, and time. Coordination normal.   Skin: Skin is warm and dry. No rash noted.   Psychiatric: She has a normal mood and affect. Her behavior is normal.   Vitals reviewed.      Assessment & Plan     1. Gastroenteritis and colitis, viral  Symptoms are consistent with gastroenteritis.  Overall she is doing better and seems that she has some reactive arthritis secondary to the viral infection with her SI joints tender and a feeling of hip soreness.  I reassured her and recommended that she try naproxen for relief.  Continue hydration and gradual advancement of diet.    2. Encounter for screening mammogram for breast cancer  Needs initial screening.  Her mother was just diagnosed with breast cancer.  Her mother is 66.  - Mammo Digital Screening Bilateral With CAD; Future    She will follow up for further health maintenance issues.

## 2017-06-17 ENCOUNTER — HOSPITAL ENCOUNTER (EMERGENCY)
Facility: HOSPITAL | Age: 47
Discharge: HOME | End: 2017-06-17
Payer: COMMERCIAL

## 2017-06-17 VITALS
TEMPERATURE: 98 F | HEART RATE: 114 BPM | BODY MASS INDEX: 31.89 KG/M2 | HEIGHT: 63 IN | WEIGHT: 180 LBS | OXYGEN SATURATION: 98 % | SYSTOLIC BLOOD PRESSURE: 163 MMHG | DIASTOLIC BLOOD PRESSURE: 98 MMHG | RESPIRATION RATE: 18 BRPM

## 2017-06-17 DIAGNOSIS — M54.50 BACK PAIN, LUMBOSACRAL: ICD-10-CM

## 2017-06-17 DIAGNOSIS — S30.0XXA CONTUSION OF LOWER BACK, INITIAL ENCOUNTER: ICD-10-CM
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
## 2017-06-17 PROCEDURE — 99283 EMERGENCY DEPT VISIT LOW MDM: CPT

## 2017-06-17 RX ORDER — CYCLOBENZAPRINE HCL 10 MG
10 TABLET ORAL 3 TIMES DAILY PRN
Qty: 20 TABLET | Refills: 0 | Status: SHIPPED | OUTPATIENT
Start: 2017-06-17 | End: 2017-06-17

## 2017-06-17 RX ORDER — CYCLOBENZAPRINE HCL 10 MG
10 TABLET ORAL 3 TIMES DAILY PRN
Qty: 20 TABLET | Refills: 0 | Status: SHIPPED | OUTPATIENT
Start: 2017-06-17 | End: 2017-07-17

## 2017-06-17 RX ORDER — HYDROCODONE BITARTRATE AND ACETAMINOPHEN 5; 325 MG/1; MG/1
1 TABLET ORAL EVERY 6 HOURS PRN
Qty: 10 TABLET | Refills: 0 | Status: SHIPPED | OUTPATIENT
Start: 2017-06-17 | End: 2017-06-27

## 2017-06-17 NOTE — ED PROVIDER NOTES
Encounter Date: 6/17/2017       History     Chief Complaint   Patient presents with    Back Pain     reports fell and hit back     Review of patient's allergies indicates:  No Known Allergies  Pt is a 45 yo female with PMHx of chronic back pain. She does have an MRI of back scheduled for this coming week. In the meantime, she has taken a fall and reports constant pain to lumbosacral area that radiates to the left buttock. Pain improves with standing. Has taken Aleve and Flexeril with minimal improvement. Pt was stepping off porch onto a ladder and slid down ladder landing on buttock area. Pt is driving herself.       The history is provided by the patient.   Fall   The accident occurred two days ago. The fall occurred from a ladder. She fell from a height of 3 to 5 ft. She landed on dirt. There was no blood loss. The point of impact was the left hip (lumbosacral area). The pain is present in the back (Lumbosacral area pain radiating to left buttock). The pain is at a severity of 7/10 (asssociated with muscle spasm). She was ambulatory at the scene. There was no entrapment after the fall. There was drug use involved in the accident. There was no alcohol use involved in the accident. Associated symptoms include back pain. Pertinent negatives include no neck pain, no paresthesias, no paralysis, no fever, no numbness, no abdominal pain, no bowel incontinence, no nausea, no vomiting, no hematuria and no loss of consciousness. The symptoms are aggravated by activity. She has tried NSAIDs for the symptoms. The treatment provided mild relief.     Past Medical History:   Diagnosis Date    Depression      Past Surgical History:   Procedure Laterality Date    NO PAST SURGERIES       No family history on file.  Social History   Substance Use Topics    Smoking status: Current Every Day Smoker     Packs/day: 0.50     Years: 40.00     Types: Cigarettes    Smokeless tobacco: Never Used    Alcohol use No     Review of Systems    Constitutional: Negative for activity change, appetite change and fever.   HENT: Negative for congestion and sore throat.    Eyes: Negative for redness and visual disturbance.   Respiratory: Negative for cough and shortness of breath.    Cardiovascular: Negative for chest pain.   Gastrointestinal: Negative for abdominal pain, bowel incontinence, nausea and vomiting.   Genitourinary: Negative for dysuria and hematuria.   Musculoskeletal: Positive for back pain. Negative for gait problem (antalgic gait) and neck pain.   Skin: Negative for rash.   Neurological: Negative for loss of consciousness, weakness, numbness and paresthesias.   Hematological: Does not bruise/bleed easily.       Physical Exam     Initial Vitals [06/17/17 1337]   BP Pulse Resp Temp SpO2   (!) 163/98 (!) 114 18 98.3 °F (36.8 °C) 98 %     Physical Exam    Nursing note and vitals reviewed.  Constitutional: She appears well-developed and well-nourished.  Non-toxic appearance.   Ambulatory with antalgic gait   HENT:   Head: Normocephalic and atraumatic.   Mouth/Throat: No oropharyngeal exudate.   Eyes: Conjunctivae and lids are normal.   Neck: Normal range of motion. Neck supple.   Cardiovascular: Normal rate, regular rhythm and normal heart sounds.   Pulmonary/Chest: Breath sounds normal. She has no wheezes. She has no rhonchi. She has no rales.   Musculoskeletal: Normal range of motion. She exhibits no edema.        Lumbar back: She exhibits tenderness. She exhibits normal range of motion, no bony tenderness, no swelling and no edema.        Back:    Lymphadenopathy:     She has no cervical adenopathy.   Neurological: She is alert and oriented to person, place, and time. She has normal strength.   Skin: Skin is warm and dry. No rash noted. No pallor.   Psychiatric: She has a normal mood and affect. Thought content normal.         ED Course   Procedures  Labs Reviewed - No data to display                  Fall, accidental, initial encounter    Back  pain, lumbosacral    Contusion of lower back, initial encounter    Other orders  -     hydrocodone-acetaminophen 5-325mg (NORCO) 5-325 mg per tablet; Take 1 tablet by mouth every 6 (six) hours as needed for Pain.  Dispense: 10 tablet; Refill: 0  -     Discontinue: cyclobenzaprine (FLEXERIL) 10 MG tablet; Take 1 tablet (10 mg total) by mouth 3 (three) times daily as needed for Muscle spasms.  Dispense: 20 tablet; Refill: 0  -     cyclobenzaprine (FLEXERIL) 10 MG tablet; Take 1 tablet (10 mg total) by mouth 3 (three) times daily as needed for Muscle spasms.  Dispense: 20 tablet; Refill: 0       Rest the back  Warm heating pad  Topical warming creams          ED Course     Clinical Impression:   The primary encounter diagnosis was Fall, accidental, initial encounter. Diagnoses of Back pain, lumbosacral and Contusion of lower back, initial encounter were also pertinent to this visit.    Follow-up Information     Chris Bolaños DO in 2 days.    Specialty:  Family Medicine  Contact information:  [address] [address] [address]  [phone]                       Nupur Hennessy NP  06/17/17 4815

## 2017-08-24 ENCOUNTER — HOSPITAL ENCOUNTER (EMERGENCY)
Facility: HOSPITAL | Age: 47
Discharge: HOME | End: 2017-08-24
Attending: EMERGENCY MEDICINE
Payer: COMMERCIAL

## 2017-08-24 VITALS
DIASTOLIC BLOOD PRESSURE: 81 MMHG | BODY MASS INDEX: 31.18 KG/M2 | TEMPERATURE: 98 F | OXYGEN SATURATION: 100 % | RESPIRATION RATE: 18 BRPM | HEIGHT: 63 IN | HEART RATE: 104 BPM | WEIGHT: 176 LBS | SYSTOLIC BLOOD PRESSURE: 174 MMHG

## 2017-08-24 DIAGNOSIS — K08.89 TOOTHACHE: Primary | ICD-10-CM

## 2017-08-24 DIAGNOSIS — K04.7 DENTAL ABSCESS: ICD-10-CM

## 2017-08-24 PROCEDURE — 99283 EMERGENCY DEPT VISIT LOW MDM: CPT

## 2017-08-24 RX ORDER — HYDROCODONE BITARTRATE AND ACETAMINOPHEN 5; 325 MG/1; MG/1
1 TABLET ORAL EVERY 4 HOURS PRN
Qty: 11 TABLET | Refills: 0 | Status: SHIPPED | OUTPATIENT
Start: 2017-08-24 | End: 2017-09-03

## 2017-08-24 RX ORDER — PENICILLIN V POTASSIUM 500 MG/1
500 TABLET, FILM COATED ORAL 4 TIMES DAILY
Qty: 40 TABLET | Refills: 0 | Status: SHIPPED | OUTPATIENT
Start: 2017-08-24 | End: 2017-08-31

## 2017-08-24 NOTE — ED PROVIDER NOTES
SCRIBE #1 NOTE: I, Lebron Mosqueda, am scribing for, and in the presence of, Vineet Mejia MD. I have scribed the entire note.      History      Chief Complaint   Patient presents with    Dental Pain     pt reports possible filling cracked or came out       Review of patient's allergies indicates:  No Known Allergies     HPI   HPI    8/24/2017, 4:11 PM   History obtained from the patient      History of Present Illness: Jael Olguin is a 47 y.o. female patient who presents to the Emergency Department for left sided toothache which onset suddenly yesterday. Pt reports she believes a filling may have come out. Symptoms are constant and moderate in severity. Exacerbated by nothing and relieved by nothing. Patient denies any fever, chills, difficulty swallowing, SOB, drooling, jaw pain, n/v, and all other sxs at this time. No further complaints or concerns at this time.     Arrival mode: Personal vehicle    PCP: Chris Bolaños DO       Past Medical History:  Past Medical History:   Diagnosis Date    Depression        Past Surgical History:  Past Surgical History:   Procedure Laterality Date    NO PAST SURGERIES           Family History:  No family history on file.    Social History:  Social History     Social History Main Topics    Smoking status: Current Every Day Smoker     Packs/day: 0.50     Years: 40.00     Types: Cigarettes    Smokeless tobacco: Never Used    Alcohol use No    Drug use: No    Sexual activity: Yes     Partners: Male       ROS   Review of Systems   Constitutional: Negative for chills and fever.   HENT: Positive for dental problem and facial swelling. Negative for congestion, sore throat and trouble swallowing.    Respiratory: Negative for cough and shortness of breath.    Cardiovascular: Negative for chest pain.   Gastrointestinal: Negative for nausea and vomiting.   Genitourinary: Negative for dysuria and hematuria.   Musculoskeletal: Negative for back pain, neck  "pain and neck stiffness.   Skin: Negative for rash.   Neurological: Negative for dizziness, weakness and light-headedness.   Hematological: Does not bruise/bleed easily.     Physical Exam      Initial Vitals [08/24/17 1601]   BP Pulse Resp Temp SpO2   (!) 174/81 104 18 98.4 °F (36.9 °C) 100 %      MAP       112          Physical Exam  Nursing Notes and Vital Signs Reviewed.  Constitutional: Patient is in no acute distress. Well-developed and well-nourished.  Head: Atraumatic. Normocephalic.  Eyes: PERRL. EOM intact. Conjunctivae are not pale. No scleral icterus.  ENT: Mucous membranes are moist. Oropharynx is clear and symmetric. Left upper toothache noted.    Neck: Supple. Full ROM. No lymphadenopathy.  Cardiovascular: Regular rate. Regular rhythm.  Pulmonary/Chest: No respiratory distress.   Abdominal: Soft and non-distended.    Musculoskeletal: Moves all extremities. No obvious deformities. No edema. No calf tenderness.  Skin: Warm and dry.  Neurological:  Alert, awake, and appropriate.  Normal speech.  No acute focal neurological deficits are appreciated.  Psychiatric: Normal affect. Good eye contact. Appropriate in content.    ED Course    Procedures  ED Vital Signs:  Vitals:    08/24/17 1601   BP: (!) 174/81   Pulse: 104   Resp: 18   Temp: 98.4 °F (36.9 °C)   TempSrc: Oral   SpO2: 100%   Weight: 79.8 kg (176 lb)   Height: 5' 3" (1.6 m)          The Emergency Provider reviewed the vital signs and test results, which are outlined above.    ED Discussion     4:11 PM: Reassessed pt at this time. Pt is awake, alert, and in NAD. Discussed with pt all pertinent ED information. Discussed pt dx of toothache and plan of tx. Gave pt all f/u and return to the ED instructions. All questions and concerns were addressed at this time. Pt expresses understanding of information and instructions, and is comfortable with plan to discharge. Pt is stable for discharge.    I discussed with patient and/or family/caretaker that " evaluation in the ED does not suggest any emergent or life threatening medical conditions requiring immediate intervention beyond what was provided in the ED, and I believe patient is safe for discharge.  Regardless, an unremarkable evaluation in the ED does not preclude the development or presence of a serious of life threatening condition. As such, patient was instructed to return immediately for any worsening or change in current symptoms.      ED Medication(s):  Medications - No data to display    Discharge Medication List as of 8/24/2017  4:13 PM      START taking these medications    Details   hydrocodone-acetaminophen 5-325mg (NORCO) 5-325 mg per tablet Take 1 tablet by mouth every 4 (four) hours as needed for Pain., Starting Thu 8/24/2017, Until Sun 9/3/2017, Print      penicillin v potassium (VEETID) 500 MG tablet Take 1 tablet (500 mg total) by mouth 4 (four) times daily., Starting Thu 8/24/2017, Until Thu 8/31/2017, Print             Follow-up Information     dentist In 2 days.                   Medical Decision Making              Scribe Attestation:   Scribe #1: I performed the above scribed service and the documentation accurately describes the services I performed. I attest to the accuracy of the note.    Attending:   Physician Attestation Statement for Scribe #1: I, Vineet Mejia MD, personally performed the services described in this documentation, as scribed by Lebron Mosqueda, in my presence, and it is both accurate and complete.          Clinical Impression       ICD-10-CM ICD-9-CM   1. Toothache K08.89 525.9   2. Dental abscess K04.7 522.5       Disposition:   Disposition: Discharged  Condition: Stable         Vineet Mejia MD  08/24/17 9393

## 2017-10-21 ENCOUNTER — HOSPITAL ENCOUNTER (EMERGENCY)
Facility: HOSPITAL | Age: 47
Discharge: HOME | End: 2017-10-21
Payer: COMMERCIAL

## 2017-10-21 VITALS
BODY MASS INDEX: 30.16 KG/M2 | RESPIRATION RATE: 18 BRPM | WEIGHT: 170.19 LBS | TEMPERATURE: 98 F | OXYGEN SATURATION: 100 % | SYSTOLIC BLOOD PRESSURE: 132 MMHG | HEIGHT: 63 IN | HEART RATE: 98 BPM | DIASTOLIC BLOOD PRESSURE: 77 MMHG

## 2017-10-21 DIAGNOSIS — M43.6 TORTICOLLIS: Primary | ICD-10-CM

## 2017-10-21 PROCEDURE — 99283 EMERGENCY DEPT VISIT LOW MDM: CPT

## 2017-10-21 RX ORDER — DICLOFENAC SODIUM 75 MG/1
75 TABLET, DELAYED RELEASE ORAL 2 TIMES DAILY
Qty: 20 TABLET | Refills: 0 | Status: SHIPPED | OUTPATIENT
Start: 2017-10-21 | End: 2017-12-28 | Stop reason: ALTCHOICE

## 2017-10-21 RX ORDER — METHYLPREDNISOLONE 4 MG/1
TABLET ORAL
Qty: 1 PACKAGE | Refills: 0 | Status: SHIPPED | OUTPATIENT
Start: 2017-10-21 | End: 2017-12-28 | Stop reason: ALTCHOICE

## 2017-10-21 RX ORDER — METHOCARBAMOL 500 MG/1
1000 TABLET, FILM COATED ORAL 3 TIMES DAILY
Qty: 30 TABLET | Refills: 0 | Status: SHIPPED | OUTPATIENT
Start: 2017-10-21 | End: 2017-10-26

## 2017-10-21 NOTE — ED PROVIDER NOTES
Encounter Date: 10/21/2017       History     Chief Complaint   Patient presents with    Neck Pain     pt c/o R-sided neck pain that radiates down R arm, x4 days, denies trauma/injury     The history is provided by the patient.   Neck Pain    This is a new problem. The current episode started several days ago. The problem occurs occasionally. The problem has been unchanged. The pain is associated with twisting. The pain is present in the right side. The quality of the pain is described as shooting. The pain radiates to the right shoulder. The pain is at a severity of 3/10. The symptoms are aggravated by twisting and position. Pertinent negatives include no photophobia, no visual change, no chest pain, no syncope, no numbness, no weight loss, no headaches, no bowel incontinence, no bladder incontinence, no leg pain, no paresis, no tingling and no weakness. She has tried nothing for the symptoms.     Review of patient's allergies indicates:  No Known Allergies  Past Medical History:   Diagnosis Date    Depression      Past Surgical History:   Procedure Laterality Date    NO PAST SURGERIES       No family history on file.  Social History   Substance Use Topics    Smoking status: Current Every Day Smoker     Packs/day: 0.50     Years: 40.00     Types: Cigarettes    Smokeless tobacco: Never Used    Alcohol use No     Review of Systems   Constitutional: Negative for chills, fever and weight loss.   HENT: Negative for sore throat.    Eyes: Negative for photophobia and redness.   Respiratory: Negative for cough and shortness of breath.    Cardiovascular: Negative for chest pain and syncope.   Gastrointestinal: Negative for abdominal pain, bowel incontinence, diarrhea and nausea.   Endocrine: Negative for polydipsia and polyphagia.   Genitourinary: Negative for bladder incontinence and dysuria.   Musculoskeletal: Positive for neck pain. Negative for arthralgias, back pain and myalgias.   Skin: Negative for rash.    Neurological: Negative for tingling, weakness, numbness and headaches.   Hematological: Does not bruise/bleed easily.   Psychiatric/Behavioral: The patient is not nervous/anxious.    All other systems reviewed and are negative.      Physical Exam     Initial Vitals [10/21/17 1021]   BP Pulse Resp Temp SpO2   132/77 98 18 97.9 °F (36.6 °C) 100 %      MAP       95.33         Physical Exam    Nursing note and vitals reviewed.  Constitutional: Vital signs are normal. She appears well-developed and well-nourished. No distress.   HENT:   Head: Normocephalic and atraumatic.   Right Ear: External ear normal.   Left Ear: External ear normal.   Nose: Nose normal.   Mouth/Throat: Oropharynx is clear and moist.   Eyes: Conjunctivae, EOM and lids are normal. Pupils are equal, round, and reactive to light.   Neck: Normal range of motion and full passive range of motion without pain. Neck supple.   Cardiovascular: Normal rate, regular rhythm, S1 normal, S2 normal, normal heart sounds, intact distal pulses and normal pulses.   Pulmonary/Chest: Breath sounds normal. No respiratory distress. She has no wheezes. She has no rales.   Abdominal: Soft. Normal appearance and bowel sounds are normal. She exhibits no distension. There is no tenderness.   Musculoskeletal:        Cervical back: She exhibits decreased range of motion, tenderness, pain and spasm. She exhibits no bony tenderness, no swelling, no edema, no deformity, no laceration and normal pulse.        Back:    Lymphadenopathy:     She has no cervical adenopathy.   Neurological: She is alert and oriented to person, place, and time. She has normal strength. No cranial nerve deficit or sensory deficit. Coordination and gait normal.   Skin: Skin is warm, dry and intact.   Psychiatric: She has a normal mood and affect. Her speech is normal and behavior is normal. Judgment and thought content normal. Cognition and memory are normal.         ED Course   Procedures  Labs Reviewed -  No data to display                       Attending Attestation:     Physician Attestation Statement for NP/PA:   I discussed this assessment and plan of this patient with the NP/PA, but I did not personally examine the patient. The face to face encounter was performed by the NP/PA.                  ED Course      Clinical Impression:   The encounter diagnosis was Torticollis.    Disposition:   Disposition: Discharged  Condition: Stable                        XAVIER Hernandez  10/21/17 1105       XAVIER Hernandez  10/21/17 1105       Gal Manning MD  10/21/17 5641

## 2017-12-28 ENCOUNTER — OFFICE VISIT (OUTPATIENT)
Dept: INTERNAL MEDICINE | Facility: CLINIC | Age: 47
End: 2017-12-28
Payer: COMMERCIAL

## 2017-12-28 VITALS
BODY MASS INDEX: 30.9 KG/M2 | SYSTOLIC BLOOD PRESSURE: 124 MMHG | WEIGHT: 174.38 LBS | DIASTOLIC BLOOD PRESSURE: 78 MMHG | TEMPERATURE: 96 F | OXYGEN SATURATION: 98 % | RESPIRATION RATE: 18 BRPM | HEART RATE: 85 BPM | HEIGHT: 63 IN

## 2017-12-28 DIAGNOSIS — J06.9 VIRAL URI: Primary | ICD-10-CM

## 2017-12-28 PROCEDURE — 99999 PR PBB SHADOW E&M-EST. PATIENT-LVL III: CPT | Mod: PBBFAC,,, | Performed by: FAMILY MEDICINE

## 2017-12-28 PROCEDURE — 99214 OFFICE O/P EST MOD 30 MIN: CPT | Mod: S$GLB,,, | Performed by: FAMILY MEDICINE

## 2017-12-28 NOTE — ASSESSMENT & PLAN NOTE
Symptoms and exam are consistent with viral infection. No need for antibiotics at this time. Advised continued supportive care with rest and hydration plus/minus OTC medications. Stressed importance of hand hygiene and avoiding the sharing of cups and utensils to keep from spreading germs to those around them.  If symptoms worsen, follow up should be arranged.

## 2017-12-28 NOTE — PROGRESS NOTES
"Subjective:       Patient ID: Jael Olguin is a 47 y.o. female.    Chief Complaint: Otalgia (for two days) and Sore Throat    Sore Throat    This is a new problem. The current episode started in the past 7 days. The problem has been waxing and waning. There has been no fever. The pain is mild. Associated symptoms include ear pain (took some tylenol). Pertinent negatives include no abdominal pain, congestion, coughing, drooling, ear discharge, hoarse voice, shortness of breath, stridor, swollen glands, trouble swallowing or vomiting. She has had no exposure to strep. She has tried acetaminophen for the symptoms. The treatment provided mild relief.       No family history on file.  No current outpatient prescriptions on file.    Review of Systems   Constitutional: Negative for chills and fever.   HENT: Positive for ear pain (took some tylenol), postnasal drip and sore throat. Negative for congestion, drooling, ear discharge, hoarse voice and trouble swallowing.    Respiratory: Negative for cough, shortness of breath and stridor.    Cardiovascular: Negative for chest pain.   Gastrointestinal: Negative for abdominal pain and vomiting.   Skin: Negative for rash.   Neurological: Negative for dizziness.       Objective:   /78 (BP Location: Right arm, Patient Position: Sitting, BP Method: Medium (Manual))   Pulse 85   Temp 96.2 °F (35.7 °C) (Tympanic)   Resp 18   Ht 5' 3" (1.6 m)   Wt 79.1 kg (174 lb 6.1 oz)   LMP 12/25/2017   SpO2 98%   BMI 30.89 kg/m²      Physical Exam   Constitutional: She is oriented to person, place, and time. She appears well-developed and well-nourished.   HENT:   Head: Normocephalic and atraumatic.   Eyes: Conjunctivae are normal.   Cardiovascular: Normal rate.    Pulmonary/Chest: Effort normal. No respiratory distress.   Musculoskeletal: She exhibits no edema.   Neurological: She is alert and oriented to person, place, and time. Coordination normal.   Skin: Skin " is warm and dry. No rash noted.   Psychiatric: She has a normal mood and affect. Her behavior is normal.   Vitals reviewed.      Assessment & Plan   Viral URI  Symptoms and exam are consistent with viral infection. No need for antibiotics at this time. Advised continued supportive care with rest and hydration plus/minus OTC medications. Stressed importance of hand hygiene and avoiding the sharing of cups and utensils to keep from spreading germs to those around them.  If symptoms worsen, follow up should be arranged.      Recommend using zyrtec d or claritin d along with flonase to help with symptoms.     No Follow-up on file.

## 2018-04-26 ENCOUNTER — HOSPITAL ENCOUNTER (EMERGENCY)
Facility: HOSPITAL | Age: 48
Discharge: HOME | End: 2018-04-26
Payer: COMMERCIAL

## 2018-04-26 VITALS
RESPIRATION RATE: 20 BRPM | TEMPERATURE: 96 F | OXYGEN SATURATION: 96 % | DIASTOLIC BLOOD PRESSURE: 79 MMHG | HEIGHT: 64 IN | BODY MASS INDEX: 29.2 KG/M2 | HEART RATE: 96 BPM | SYSTOLIC BLOOD PRESSURE: 183 MMHG | WEIGHT: 171.06 LBS

## 2018-04-26 DIAGNOSIS — Z72.0 TOBACCO USE: ICD-10-CM

## 2018-04-26 DIAGNOSIS — K08.89 PAIN, DENTAL: Primary | ICD-10-CM

## 2018-04-26 DIAGNOSIS — R03.0 ELEVATED BLOOD PRESSURE READING: ICD-10-CM

## 2018-04-26 PROCEDURE — 99283 EMERGENCY DEPT VISIT LOW MDM: CPT

## 2018-04-26 RX ORDER — PENICILLIN V POTASSIUM 500 MG/1
500 TABLET, FILM COATED ORAL 4 TIMES DAILY
Qty: 40 TABLET | Refills: 0 | Status: SHIPPED | OUTPATIENT
Start: 2018-04-26 | End: 2018-05-06

## 2018-04-26 RX ORDER — TRAMADOL HYDROCHLORIDE 50 MG/1
50 TABLET ORAL EVERY 6 HOURS PRN
Qty: 10 TABLET | Refills: 0 | Status: SHIPPED | OUTPATIENT
Start: 2018-04-26 | End: 2018-05-29

## 2018-04-26 NOTE — ED PROVIDER NOTES
"   History      Chief Complaint   Patient presents with    Dental Pain     "my filing came out"       Review of patient's allergies indicates:  No Known Allergies     HPI   HPI    4/26/2018, 3:01 PM   History obtained from the patient      History of Present Illness: Jael Olguin is a 47 y.o. female patient who presents to the Emergency Department for dental pain for 2 days. She says her filling broke off.  Denies f/n/v.  Symptoms are constant and moderate in severity.  No mitigating or exacerbating factors reported.   No further complaints or concerns at this time.           PCP: Chris Bolaños DO       Past Medical History:  Past Medical History:   Diagnosis Date    Depression          Past Surgical History:  Past Surgical History:   Procedure Laterality Date    NO PAST SURGERIES             Family History:  No family history on file.        Social History:  Social History     Social History Main Topics    Smoking status: Current Every Day Smoker     Packs/day: 0.50     Years: 40.00     Types: Cigarettes    Smokeless tobacco: Never Used    Alcohol use No    Drug use: No    Sexual activity: Yes     Partners: Male       ROS   Constitutional: Negative for chills and fever.   HENT: Positive for dental problem. Negative for sore throat.    Respiratory: Negative for chest tightness and shortness of breath.    Cardiovascular: Negative for chest pain.   Gastrointestinal: Negative for nausea and vomiting.   Genitourinary: Negative for dysuria.   Musculoskeletal: Negative for back pain.   Skin: Negative for pallor and rash.   Neurological: Negative for weakness.   Hematological: Does not bruise/bleed easily.   All other systems reviewed and are negative.  Review of Systems    Physical Exam      Initial Vitals [04/26/18 1428]   BP Pulse Resp Temp SpO2   (!) 183/79 96 20 96 °F (35.6 °C) 96 %      MAP       113.67         Physical Exam  Vital signs and nursing notes reviewed.  Constitutional: " "Patient is in NAD. Awake and alert. Well-developed and well-nourished.  Head: Atraumatic. Normocephalic.  Eyes: PERRL. EOM intact. Conjunctivae nl. No scleral icterus.  ENT: Mucous membranes are moist. Oropharynx is clear.  gingival ttp, mild erythema.  +dental caries.  No facial edema  Neck: Supple. No JVD. No lymphadenopathy.  No meningismus  Cardiovascular: Regular rate and rhythm. No murmurs, rubs, or gallops. Distal pulses are 2+ and symmetric.  Pulmonary/Chest: No respiratory distress. Clear to auscultation bilaterally. No wheezing, rales, or rhonchi.  Abdominal: Soft. Non-distended. No TTP. No rebound, guarding, or rigidity. Good bowel sounds.  Genitourinary: No CVA tenderness  Musculoskeletal: Moves all extremities. No edema.   Skin: Warm and dry.  Neurological: Awake and alert. No acute focal neurological deficits are appreciated.  Psychiatric: Normal affect. Good eye contact. Appropriate in content.      ED Course      Procedures  ED Vital Signs:  Vitals:    04/26/18 1428   BP: (!) 183/79   Pulse: 96   Resp: 20   Temp: 96 °F (35.6 °C)   TempSrc: Oral   SpO2: 96%   Weight: 77.6 kg (171 lb 1.2 oz)   Height: 5' 3.5" (1.613 m)                 Imaging Results:  Imaging Results    None            The Emergency Provider reviewed the vital signs and test results, which are outlined above.    ED Discussion         All findings were reviewed with the patient/family in detail.  Findings seem to be most consistent with a diagnosis of dental pain and dental caries.  GAVE PT LIST OF DENTAL RESOURCES.  DISCUSSED POTENTIAL COMPLICATIONS IF PT DOES NOT FOLLOW UP WITH DENTIST.  All remaining questions and concerns were addressed at that time.  Patient/family has been counseled regarding the need for follow-up as well as the indication to return to the emergency room should new or worrisome developments occur.    Pre-hypertension/Hypertension: The pt has been informed that they may have pre-hypertension or hypertension based " on a blood pressure reading in the ED. I recommend that the pt call the PCP listed on their discharge instructions or a physician of their choice this week to arrange f/u for further evaluation of possible pre-hypertension or hypertension.         Medication(s) given in the ER:  Medications - No data to display        Follow-up Information     Your Dentist In 2 days.                     New Prescriptions    PENICILLIN V POTASSIUM (VEETID) 500 MG TABLET    Take 1 tablet (500 mg total) by mouth 4 (four) times daily.    TRAMADOL (ULTRAM) 50 MG TABLET    Take 1 tablet (50 mg total) by mouth every 6 (six) hours as needed for Pain.          Medical Decision Making              MDM   Additional MDM:   Hypertension: The patient has hypertension (no treatment required at this time). The patient's condition was felt to be stable.   Smoking Cessation: The patient was counseled on tobacco related  health complications. Appropriate patient literature was given to the patient concerning tobacco cessation.              Clinical Impression:        ICD-10-CM ICD-9-CM   1. Pain, dental K08.89 525.9   2. Elevated blood pressure reading R03.0 796.2   3. Tobacco use Z72.0 305.1              Disposition  Stable  Discharged     Flor Cardona PA-C  04/26/18 3898

## 2018-04-26 NOTE — ED NOTES
Pt examined by Flor PARK  without RN, educated on prescriptions, given discharge instructions and discharged to [facility]. See provider notes for exam.

## 2018-05-29 ENCOUNTER — HOSPITAL ENCOUNTER (OUTPATIENT)
Dept: RADIOLOGY | Facility: HOSPITAL | Age: 48
Discharge: HOME | End: 2018-05-29
Attending: PHYSICIAN ASSISTANT
Payer: COMMERCIAL

## 2018-05-29 ENCOUNTER — OFFICE VISIT (OUTPATIENT)
Dept: INTERNAL MEDICINE | Facility: CLINIC | Age: 48
End: 2018-05-29
Payer: COMMERCIAL

## 2018-05-29 VITALS
WEIGHT: 173.31 LBS | BODY MASS INDEX: 29.59 KG/M2 | TEMPERATURE: 98 F | SYSTOLIC BLOOD PRESSURE: 132 MMHG | HEIGHT: 64 IN | DIASTOLIC BLOOD PRESSURE: 86 MMHG | HEART RATE: 83 BPM | RESPIRATION RATE: 16 BRPM | OXYGEN SATURATION: 99 %

## 2018-05-29 DIAGNOSIS — M79.2 INTRACTABLE CERVICAL NEUROPATHIC PAIN: Primary | ICD-10-CM

## 2018-05-29 DIAGNOSIS — M25.512 ACUTE PAIN OF LEFT SHOULDER: ICD-10-CM

## 2018-05-29 DIAGNOSIS — M79.2 INTRACTABLE CERVICAL NEUROPATHIC PAIN: ICD-10-CM
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
## 2018-05-29 PROBLEM — E66.811 OBESITY, CLASS I, BMI 30-34.9: Status: ACTIVE | Noted: 2018-05-29

## 2018-05-29 PROBLEM — E66.9 OBESITY, CLASS I, BMI 30-34.9: Status: ACTIVE | Noted: 2018-05-29

## 2018-05-29 PROCEDURE — 73030 X-RAY EXAM OF SHOULDER: CPT | Mod: TC,FY,PO,LT

## 2018-05-29 PROCEDURE — 3008F BODY MASS INDEX DOCD: CPT | Mod: CPTII,S$GLB,, | Performed by: PHYSICIAN ASSISTANT

## 2018-05-29 PROCEDURE — 72050 X-RAY EXAM NECK SPINE 4/5VWS: CPT | Mod: 26,,, | Performed by: RADIOLOGY

## 2018-05-29 PROCEDURE — 99999 PR PBB SHADOW E&M-EST. PATIENT-LVL IV: CPT | Mod: PBBFAC,,, | Performed by: PHYSICIAN ASSISTANT

## 2018-05-29 PROCEDURE — 72050 X-RAY EXAM NECK SPINE 4/5VWS: CPT | Mod: TC,FY,PO

## 2018-05-29 PROCEDURE — 99213 OFFICE O/P EST LOW 20 MIN: CPT | Mod: 25,S$GLB,, | Performed by: PHYSICIAN ASSISTANT

## 2018-05-29 PROCEDURE — 96372 THER/PROPH/DIAG INJ SC/IM: CPT | Mod: S$GLB,,, | Performed by: PEDIATRICS

## 2018-05-29 PROCEDURE — 73030 X-RAY EXAM OF SHOULDER: CPT | Mod: 26,LT,, | Performed by: RADIOLOGY

## 2018-05-29 RX ORDER — KETOROLAC TROMETHAMINE 30 MG/ML
60 INJECTION, SOLUTION INTRAMUSCULAR; INTRAVENOUS
Status: COMPLETED | OUTPATIENT
Start: 2018-05-29 | End: 2018-05-29

## 2018-05-29 RX ORDER — MELOXICAM 15 MG/1
15 TABLET ORAL DAILY
Qty: 20 TABLET | Refills: 0 | Status: SHIPPED | OUTPATIENT
Start: 2018-05-29 | End: 2018-06-19

## 2018-05-29 RX ORDER — TRAMADOL HYDROCHLORIDE 50 MG/1
50 TABLET ORAL
Qty: 40 TABLET | Refills: 0 | Status: SHIPPED | OUTPATIENT
Start: 2018-05-29 | End: 2018-06-08

## 2018-05-29 RX ORDER — IBUPROFEN 800 MG/1
800 TABLET ORAL
COMMUNITY
End: 2020-05-01

## 2018-05-29 RX ADMIN — KETOROLAC TROMETHAMINE 60 MG: 30 INJECTION, SOLUTION INTRAMUSCULAR; INTRAVENOUS at 11:05

## 2018-05-29 NOTE — PROGRESS NOTES
Pt exhibited more mobility in left arm after Toradol injection, pt reported less stiffness and 8/0-10 scale pain LE 30min post injection. Pt advised to call clinic PRN, take tramadol rx as directed, and d/c from visit.

## 2018-07-27 DIAGNOSIS — Z12.39 BREAST CANCER SCREENING: ICD-10-CM

## 2018-10-12 ENCOUNTER — PATIENT MESSAGE (OUTPATIENT)
Dept: INTERNAL MEDICINE | Facility: CLINIC | Age: 48
End: 2018-10-12

## 2018-10-28 ENCOUNTER — HOSPITAL ENCOUNTER (EMERGENCY)
Facility: HOSPITAL | Age: 48
Discharge: HOME | End: 2018-10-28
Attending: EMERGENCY MEDICINE

## 2018-10-28 VITALS
HEART RATE: 70 BPM | OXYGEN SATURATION: 100 % | HEIGHT: 64 IN | WEIGHT: 169.56 LBS | BODY MASS INDEX: 28.95 KG/M2 | DIASTOLIC BLOOD PRESSURE: 73 MMHG | RESPIRATION RATE: 20 BRPM | TEMPERATURE: 98 F | SYSTOLIC BLOOD PRESSURE: 128 MMHG

## 2018-10-28 DIAGNOSIS — R19.7 DIARRHEA, UNSPECIFIED TYPE: Primary | ICD-10-CM

## 2018-10-28 DIAGNOSIS — R10.9 ABDOMINAL PAIN, UNSPECIFIED ABDOMINAL LOCATION: ICD-10-CM

## 2018-10-28 LAB
ALBUMIN SERPL BCP-MCNC: 4.3 G/DL
ALP SERPL-CCNC: 62 U/L
ALT SERPL W/O P-5'-P-CCNC: 12 U/L
ANION GAP SERPL CALC-SCNC: 14 MMOL/L
AST SERPL-CCNC: 15 U/L
BACTERIA #/AREA URNS AUTO: NORMAL /HPF
BASOPHILS # BLD AUTO: 0.05 K/UL
BASOPHILS NFR BLD: 0.4 %
BILIRUB SERPL-MCNC: 0.4 MG/DL
BILIRUB UR QL STRIP: NEGATIVE
BUN SERPL-MCNC: 15 MG/DL
CALCIUM SERPL-MCNC: 10.2 MG/DL
CHLORIDE SERPL-SCNC: 106 MMOL/L
CLARITY UR REFRACT.AUTO: CLEAR
CO2 SERPL-SCNC: 22 MMOL/L
COLOR UR AUTO: YELLOW
CREAT SERPL-MCNC: 0.8 MG/DL
DIFFERENTIAL METHOD: ABNORMAL
EOSINOPHIL # BLD AUTO: 0 K/UL
EOSINOPHIL NFR BLD: 0.1 %
ERYTHROCYTE [DISTWIDTH] IN BLOOD BY AUTOMATED COUNT: 14.1 %
EST. GFR  (AFRICAN AMERICAN): >60 ML/MIN/1.73 M^2
EST. GFR  (NON AFRICAN AMERICAN): >60 ML/MIN/1.73 M^2
GLUCOSE SERPL-MCNC: 94 MG/DL
GLUCOSE UR QL STRIP: NEGATIVE
HCT VFR BLD AUTO: 43.8 %
HGB BLD-MCNC: 15 G/DL
HGB UR QL STRIP: ABNORMAL
KETONES UR QL STRIP: ABNORMAL
LEUKOCYTE ESTERASE UR QL STRIP: ABNORMAL
LIPASE SERPL-CCNC: 15 U/L
LYMPHOCYTES # BLD AUTO: 2.4 K/UL
LYMPHOCYTES NFR BLD: 17.3 %
MCH RBC QN AUTO: 30.1 PG
MCHC RBC AUTO-ENTMCNC: 34.2 G/DL
MCV RBC AUTO: 88 FL
MICROSCOPIC COMMENT: NORMAL
MONOCYTES # BLD AUTO: 0.7 K/UL
MONOCYTES NFR BLD: 4.8 %
NEUTROPHILS # BLD AUTO: 10.7 K/UL
NEUTROPHILS NFR BLD: 77.3 %
NITRITE UR QL STRIP: NEGATIVE
PH UR STRIP: 6 [PH] (ref 5–8)
PLATELET # BLD AUTO: 473 K/UL
PMV BLD AUTO: 9.3 FL
POTASSIUM SERPL-SCNC: 3.6 MMOL/L
PROT SERPL-MCNC: 8.2 G/DL
PROT UR QL STRIP: NEGATIVE
RBC # BLD AUTO: 4.98 M/UL
RBC #/AREA URNS AUTO: 0 /HPF (ref 0–4)
SODIUM SERPL-SCNC: 142 MMOL/L
SP GR UR STRIP: 1.02 (ref 1–1.03)
SQUAMOUS #/AREA URNS AUTO: 3 /HPF
URN SPEC COLLECT METH UR: ABNORMAL
UROBILINOGEN UR STRIP-ACNC: NEGATIVE EU/DL
WBC # BLD AUTO: 13.85 K/UL
WBC #/AREA URNS AUTO: 3 /HPF (ref 0–5)

## 2018-10-28 PROCEDURE — 99285 EMERGENCY DEPT VISIT HI MDM: CPT | Mod: 25

## 2018-10-28 PROCEDURE — 85025 COMPLETE CBC W/AUTO DIFF WBC: CPT

## 2018-10-28 PROCEDURE — 96375 TX/PRO/DX INJ NEW DRUG ADDON: CPT

## 2018-10-28 PROCEDURE — 63600175 PHARM REV CODE 636 W HCPCS: Performed by: EMERGENCY MEDICINE

## 2018-10-28 PROCEDURE — 25000003 PHARM REV CODE 250: Performed by: EMERGENCY MEDICINE

## 2018-10-28 PROCEDURE — 96365 THER/PROPH/DIAG IV INF INIT: CPT

## 2018-10-28 PROCEDURE — 96361 HYDRATE IV INFUSION ADD-ON: CPT

## 2018-10-28 PROCEDURE — 80053 COMPREHEN METABOLIC PANEL: CPT

## 2018-10-28 PROCEDURE — 81000 URINALYSIS NONAUTO W/SCOPE: CPT

## 2018-10-28 PROCEDURE — 83690 ASSAY OF LIPASE: CPT

## 2018-10-28 PROCEDURE — 25500020 PHARM REV CODE 255: Performed by: EMERGENCY MEDICINE

## 2018-10-28 RX ORDER — KETOROLAC TROMETHAMINE 30 MG/ML
15 INJECTION, SOLUTION INTRAMUSCULAR; INTRAVENOUS
Status: COMPLETED | OUTPATIENT
Start: 2018-10-28 | End: 2018-10-28

## 2018-10-28 RX ORDER — LOPERAMIDE HYDROCHLORIDE 2 MG/1
2 CAPSULE ORAL 4 TIMES DAILY PRN
Qty: 12 CAPSULE | Refills: 0 | Status: SHIPPED | OUTPATIENT
Start: 2018-10-28 | End: 2018-11-07

## 2018-10-28 RX ORDER — MORPHINE SULFATE 4 MG/ML
4 INJECTION, SOLUTION INTRAMUSCULAR; INTRAVENOUS
Status: COMPLETED | OUTPATIENT
Start: 2018-10-28 | End: 2018-10-28

## 2018-10-28 RX ORDER — DICYCLOMINE HYDROCHLORIDE 20 MG/1
20 TABLET ORAL 2 TIMES DAILY
Qty: 20 TABLET | Refills: 0 | Status: SHIPPED | OUTPATIENT
Start: 2018-10-28 | End: 2018-11-27

## 2018-10-28 RX ORDER — ONDANSETRON 2 MG/ML
4 INJECTION INTRAMUSCULAR; INTRAVENOUS
Status: COMPLETED | OUTPATIENT
Start: 2018-10-28 | End: 2018-10-28

## 2018-10-28 RX ORDER — ONDANSETRON 4 MG/1
4 TABLET, FILM COATED ORAL EVERY 8 HOURS PRN
Qty: 12 TABLET | Refills: 0 | Status: SHIPPED | OUTPATIENT
Start: 2018-10-28 | End: 2020-05-01

## 2018-10-28 RX ADMIN — IOHEXOL 75 ML: 350 INJECTION, SOLUTION INTRAVENOUS at 08:10

## 2018-10-28 RX ADMIN — KETOROLAC TROMETHAMINE 15 MG: 30 INJECTION INTRAMUSCULAR; INTRAVENOUS at 07:10

## 2018-10-28 RX ADMIN — ONDANSETRON 4 MG: 2 INJECTION INTRAMUSCULAR; INTRAVENOUS at 07:10

## 2018-10-28 RX ADMIN — PROMETHAZINE HYDROCHLORIDE 12.5 MG: 25 INJECTION INTRAMUSCULAR; INTRAVENOUS at 08:10

## 2018-10-28 RX ADMIN — SODIUM CHLORIDE 1000 ML: 0.9 INJECTION, SOLUTION INTRAVENOUS at 07:10

## 2018-10-28 RX ADMIN — MORPHINE SULFATE 4 MG: 4 INJECTION INTRAVENOUS at 08:10
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
## 2018-11-30 ENCOUNTER — PATIENT OUTREACH (OUTPATIENT)
Dept: ADMINISTRATIVE | Facility: HOSPITAL | Age: 48
End: 2018-11-30

## 2019-10-18 DIAGNOSIS — Z12.39 BREAST CANCER SCREENING: ICD-10-CM

## 2019-12-27 ENCOUNTER — TELEPHONE (OUTPATIENT)
Dept: INTERNAL MEDICINE | Facility: CLINIC | Age: 49
End: 2019-12-27

## 2019-12-27 NOTE — TELEPHONE ENCOUNTER
----- Message from Malika Gupta LPN sent at 12/27/2019  4:49 PM CST -----  Contact: Self   Can you schedule medicaid appt for me  ----- Message -----  From: Maria Guadalupe Ireland  Sent: 12/27/2019   3:10 PM CST  To: [facility] Staff    Type:  Sooner Apoointment Request    Caller is requesting a sooner appointment.  Caller declined first available appointment listed below.  Caller will not accept being placed on the waitlist and is requesting a message be sent to doctor.  Name of Caller:Jael  When is the first available appointment?none available  Symptoms:lump on neck//lower back pain  Would the patient rather a call back or a response via [facility]? call  Best Call Back Number:[phone]  Additional Information:

## 2020-05-01 ENCOUNTER — TELEPHONE (OUTPATIENT)
Dept: INTERNAL MEDICINE | Facility: CLINIC | Age: 50
End: 2020-05-01

## 2020-05-01 ENCOUNTER — CLINICAL SUPPORT (OUTPATIENT)
Dept: URGENT CARE | Facility: CLINIC | Age: 50
End: 2020-05-01
Payer: MEDICAID

## 2020-05-01 ENCOUNTER — OFFICE VISIT (OUTPATIENT)
Dept: INTERNAL MEDICINE | Facility: CLINIC | Age: 50
End: 2020-05-01
Payer: MEDICAID

## 2020-05-01 VITALS — OXYGEN SATURATION: 95 % | HEART RATE: 98 BPM | TEMPERATURE: 99 F

## 2020-05-01 DIAGNOSIS — R05.9 COUGH: Primary | ICD-10-CM

## 2020-05-01 DIAGNOSIS — Z72.0 TOBACCO USE: ICD-10-CM

## 2020-05-01 DIAGNOSIS — R05.9 COUGH: ICD-10-CM

## 2020-05-01 PROBLEM — J06.9 VIRAL URI: Status: RESOLVED | Noted: 2017-12-28 | Resolved: 2020-05-01
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
## 2020-05-01 PROCEDURE — U0002 COVID-19 LAB TEST NON-CDC: HCPCS

## 2020-05-01 PROCEDURE — 99213 PR OFFICE/OUTPT VISIT, EST, LEVL III, 20-29 MIN: ICD-10-PCS | Mod: 95,,, | Performed by: NURSE PRACTITIONER

## 2020-05-01 PROCEDURE — 99213 OFFICE O/P EST LOW 20 MIN: CPT | Mod: 95,,, | Performed by: NURSE PRACTITIONER

## 2020-05-01 RX ORDER — CODEINE PHOSPHATE AND GUAIFENESIN 10; 100 MG/5ML; MG/5ML
5 SOLUTION ORAL 3 TIMES DAILY PRN
Qty: 240 ML | Refills: 0 | Status: SHIPPED | OUTPATIENT
Start: 2020-05-01 | End: 2020-05-11

## 2020-05-01 NOTE — TELEPHONE ENCOUNTER
Patient called in requesting to have same day appointment for possible sinus infection. Patient was scheduled for 2:40 pm with BLANCA Foreman. Patient informed and verbally understood the appt information .

## 2020-05-01 NOTE — TELEPHONE ENCOUNTER
----- Message from Lin Rodriguez sent at 5/1/2020 12:36 PM CDT -----  Contact: pt   Pt stated she will like to be seen for breathing problems, she can be reached at [phone]

## 2020-05-01 NOTE — PROGRESS NOTES
Subjective:       Patient ID: Jael Olguin is a 49 y.o. female.    Chief Complaint: No chief complaint on file.    The patient location is: [facility]  The chief complaint leading to consultation is: cough and chest soreness/tightness with deep breaths.   Visit type: audiovisual  Total time spent with patient: 15 minutes  Each patient to whom he or she provides medical services by telemedicine is:  (1) informed of the relationship between the physician and patient and the respective role of any other health care provider with respect to management of the patient; and (2) notified that he or she may decline to receive medical services by telemedicine and may withdraw from such care at any time.    Notes:   Mrs Olguin presents to visit with complaints of cough and chest tightness and soreness with cough. She reports that she does have a hx of bronchitis, and also had similar symptoms approx 3 weeks ago, but symptoms improved, then worsened again approx 1 week ago. She has tried promethazine and robitussin DM without any relief of cough or symptoms. Associated symptoms include chest and back soreness/tightness with cough and deep breaths, intermittent SOB with cough, and nasal congestion. Denies fever or any known contact to COVID.  She has also tried ibuprofen, flexeril, and robaxin for back discomfort without any relief. Pt does not have access to equipment for VS.    Cough   This is a new problem. The current episode started 1 to 4 weeks ago. The problem has been gradually worsening. The problem occurs every few minutes. The cough is non-productive. Associated symptoms include nasal congestion, postnasal drip, rhinorrhea and shortness of breath (intermittent). Pertinent negatives include no chest pain, chills, ear congestion, fever, headaches, hemoptysis, myalgias, sore throat, sweats or wheezing. Nothing aggravates the symptoms. Risk factors for lung disease include smoking/tobacco  exposure.       Patient Active Problem List   Diagnosis    Depression    Obesity, Class I, BMI 30-34.9    Cough    Tobacco use       Family History   Problem Relation Age of Onset    Breast cancer Mother 65        Stage II S/p BL Mastectomy    No Known Problems Father      Past Surgical History:   Procedure Laterality Date    WISDOM TOOTH EXTRACTION           Current Outpatient Medications:     benzonatate (TESSALON) 200 MG capsule, Take 1 capsule (200 mg total) by mouth 3 (three) times daily as needed for Cough., Disp: 30 capsule, Rfl: 0    guaifenesin-codeine 100-10 mg/5 ml (TUSSI-ORGANIDIN NR)  mg/5 mL syrup, Take 5 mLs by mouth 3 (three) times daily as needed for Cough., Disp: 240 mL, Rfl: 0    Review of Systems   Constitutional: Negative for chills, fatigue and fever.        Denies any change in smell or taste.   HENT: Positive for congestion, postnasal drip and rhinorrhea. Negative for sinus pressure, sinus pain, sneezing and sore throat.    Respiratory: Positive for cough, chest tightness and shortness of breath (intermittent). Negative for hemoptysis and wheezing.    Cardiovascular: Negative for chest pain and palpitations.   Gastrointestinal: Positive for nausea. Negative for abdominal pain, constipation and vomiting.   Musculoskeletal: Positive for back pain. Negative for myalgias.   Neurological: Negative for dizziness, syncope, light-headedness and headaches.       Objective:   There were no vitals taken for this visit.     Physical Exam   Constitutional: She is oriented to person, place, and time. She appears well-developed and well-nourished. No distress.   Neck: Normal range of motion.   Pulmonary/Chest: Effort normal. No respiratory distress. She exhibits no tenderness.   Frequent dry cough   Musculoskeletal: Normal range of motion.   Neurological: She is alert and oriented to person, place, and time.   Skin: Skin is warm and dry.   Psychiatric: She has a normal mood and affect. Her  speech is normal and behavior is normal.       Assessment & Plan     Problem List Items Addressed This Visit        Pulmonary    Cough - Primary    Current Assessment & Plan     Sending to [facility] for COVID testing considering presenting symptoms. Recommended taking appropriate COVID precautions until results come back. Discussed 14 day quarantine, appropriate hand hygiene, and appropriate quarantine measures. Will send in abx if test are negative to cover for sinusitis/pneumonia/bronchitis with hx of smoking and prevalence of symptoms being recurrent for over 3 weeks.          Relevant Medications    guaifenesin-codeine 100-10 mg/5 ml (TUSSI-ORGANIDIN NR)  mg/5 mL syrup    Other Relevant Orders    COVID-19 Routine Screening       Other    Tobacco use    Current Assessment & Plan     Counseled on the importance of smoke cessation for improvement of quality of life and reduction of risk for comorbidities.                 Follow up if symptoms worsen or fail to improve.

## 2020-05-01 NOTE — TELEPHONE ENCOUNTER
Can you please schedule patient for a visit on today (virtual) with Kellen for possible sinus infection @ 2:40 pm      ___Thanks

## 2020-05-03 ENCOUNTER — PATIENT MESSAGE (OUTPATIENT)
Dept: INTERNAL MEDICINE | Facility: CLINIC | Age: 50
End: 2020-05-03

## 2020-05-04 ENCOUNTER — PATIENT MESSAGE (OUTPATIENT)
Dept: INTERNAL MEDICINE | Facility: CLINIC | Age: 50
End: 2020-05-04

## 2020-05-04 DIAGNOSIS — R05.9 COUGH: Primary | ICD-10-CM

## 2020-05-04 PROBLEM — Z72.0 TOBACCO USE: Status: ACTIVE | Noted: 2020-05-04

## 2020-05-04 RX ORDER — BENZONATATE 200 MG/1
200 CAPSULE ORAL 3 TIMES DAILY PRN
Qty: 30 CAPSULE | Refills: 0 | Status: SHIPPED | OUTPATIENT
Start: 2020-05-04 | End: 2020-05-14

## 2020-05-04 NOTE — ASSESSMENT & PLAN NOTE
Counseled on the importance of smoke cessation for improvement of quality of life and reduction of risk for comorbidities.    PA form completed. Signed by Dr. Vanegas and faxed to [facility].

## 2020-05-04 NOTE — ASSESSMENT & PLAN NOTE
Sending to [facility] for COVID testing considering presenting symptoms. Recommended taking appropriate COVID precautions until results come back. Discussed 14 day quarantine, appropriate hand hygiene, and appropriate quarantine measures. Will send in abx if test are negative to cover for sinusitis/pneumonia/bronchitis with hx of smoking and prevalence of symptoms being recurrent for over 3 weeks.

## 2020-05-05 ENCOUNTER — PATIENT MESSAGE (OUTPATIENT)
Dept: INTERNAL MEDICINE | Facility: CLINIC | Age: 50
End: 2020-05-05

## 2020-05-05 DIAGNOSIS — R05.9 COUGH: ICD-10-CM

## 2020-05-05 LAB — SARS-COV-2 RNA RESP QL NAA+PROBE: NOT DETECTED

## 2020-05-05 RX ORDER — CODEINE PHOSPHATE AND GUAIFENESIN 10; 100 MG/5ML; MG/5ML
5 SOLUTION ORAL 3 TIMES DAILY PRN
Qty: 240 ML | Refills: 0 | Status: CANCELLED | OUTPATIENT
Start: 2020-05-05 | End: 2020-05-15

## 2020-05-06 ENCOUNTER — TELEPHONE (OUTPATIENT)
Dept: INTERNAL MEDICINE | Facility: CLINIC | Age: 50
End: 2020-05-06

## 2020-05-06 DIAGNOSIS — J01.90 ACUTE NON-RECURRENT SINUSITIS, UNSPECIFIED LOCATION: Primary | ICD-10-CM

## 2020-05-06 RX ORDER — AMOXICILLIN AND CLAVULANATE POTASSIUM 875; 125 MG/1; MG/1
1 TABLET, FILM COATED ORAL EVERY 12 HOURS
Qty: 14 TABLET | Refills: 0 | Status: SHIPPED | OUTPATIENT
Start: 2020-05-06 | End: 2020-05-13

## 2020-05-06 NOTE — TELEPHONE ENCOUNTER
Pt requesting antibiotic to be sent as discussed. Pt advised to get over the counter Robitussin for cough.

## 2020-08-17 ENCOUNTER — HOSPITAL ENCOUNTER (EMERGENCY)
Facility: HOSPITAL | Age: 50
Discharge: HOME | End: 2020-08-17
Attending: EMERGENCY MEDICINE
Payer: MEDICAID

## 2020-08-17 VITALS
SYSTOLIC BLOOD PRESSURE: 135 MMHG | BODY MASS INDEX: 32.37 KG/M2 | HEIGHT: 64 IN | WEIGHT: 189.63 LBS | OXYGEN SATURATION: 97 % | DIASTOLIC BLOOD PRESSURE: 76 MMHG | RESPIRATION RATE: 20 BRPM | TEMPERATURE: 98 F | HEART RATE: 92 BPM

## 2020-08-17 DIAGNOSIS — L03.90 CELLULITIS, UNSPECIFIED CELLULITIS SITE: ICD-10-CM

## 2020-08-17 DIAGNOSIS — L02.91 ABSCESS: Primary | ICD-10-CM

## 2020-08-17 PROCEDURE — 25000003 PHARM REV CODE 250: Mod: ER | Performed by: EMERGENCY MEDICINE

## 2020-08-17 PROCEDURE — 10061 I&D ABSCESS COMP/MULTIPLE: CPT | Mod: ER

## 2020-08-17 PROCEDURE — 99284 EMERGENCY DEPT VISIT MOD MDM: CPT | Mod: 25,ER

## 2020-08-17 RX ORDER — SULFAMETHOXAZOLE AND TRIMETHOPRIM 800; 160 MG/1; MG/1
1 TABLET ORAL
Status: COMPLETED | OUTPATIENT
Start: 2020-08-17 | End: 2020-08-17

## 2020-08-17 RX ORDER — HYDROCODONE BITARTRATE AND ACETAMINOPHEN 5; 325 MG/1; MG/1
1 TABLET ORAL EVERY 4 HOURS PRN
Qty: 7 TABLET | Refills: 0 | OUTPATIENT
Start: 2020-08-17 | End: 2020-08-19

## 2020-08-17 RX ORDER — LIDOCAINE HYDROCHLORIDE AND EPINEPHRINE 10; 10 MG/ML; UG/ML
10 INJECTION, SOLUTION INFILTRATION; PERINEURAL
Status: COMPLETED | OUTPATIENT
Start: 2020-08-17 | End: 2020-08-17

## 2020-08-17 RX ORDER — SULFAMETHOXAZOLE AND TRIMETHOPRIM 800; 160 MG/1; MG/1
1 TABLET ORAL 2 TIMES DAILY
Qty: 14 TABLET | Refills: 0 | Status: SHIPPED | OUTPATIENT
Start: 2020-08-17 | End: 2020-08-21 | Stop reason: SDUPTHER

## 2020-08-17 RX ADMIN — LIDOCAINE HYDROCHLORIDE,EPINEPHRINE BITARTRATE 10 ML: 10; .01 INJECTION, SOLUTION INFILTRATION; PERINEURAL at 09:08

## 2020-08-17 RX ADMIN — SULFAMETHOXAZOLE AND TRIMETHOPRIM 1 TABLET: 800; 160 TABLET ORAL at 09:08

## 2020-08-17 NOTE — DISCHARGE INSTRUCTIONS
For  CELLULITIS/ABSCESS, I advised patient to: keep area clean and dry; apply antibiotic ointment as prescribed; refrain from squeezing or irritating site; apply moist heat to help with pain and abscess drainage; and to take antibiotics as prescribed. Patient was instructed to follow up with primary care provider, urgent care facility, or the emergency room if symptoms worsen and they develop a fever greater than 102.5 °F, have pain unrelieved by OTC or prescription medications, and excessive swelling. Also instructed patient to follow up with provider in 24-48 hours for wound re-check and possible packing change.      Driving or other activities under influence of medications - Patient and/or family/caretaker was given a prescription for, or instructed to use a medicine that may impair ability to drive, operate machinery, or participate in other potentially dangerous activities.  Patient was instructed not to participate in these activities while under the influence of these medications.

## 2020-08-17 NOTE — ED NOTES
Pt resting in bed. NAD, VSS, RR equal and unlabored. Will continue to monitor. Pt alert and oriented, calm/cooperative, and in NAD.
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
## 2020-08-19 ENCOUNTER — HOSPITAL ENCOUNTER (EMERGENCY)
Facility: HOSPITAL | Age: 50
Discharge: HOME | End: 2020-08-19
Attending: EMERGENCY MEDICINE
Payer: MEDICAID

## 2020-08-19 VITALS
SYSTOLIC BLOOD PRESSURE: 125 MMHG | WEIGHT: 188.5 LBS | RESPIRATION RATE: 19 BRPM | BODY MASS INDEX: 32.18 KG/M2 | DIASTOLIC BLOOD PRESSURE: 73 MMHG | HEART RATE: 102 BPM | TEMPERATURE: 99 F | OXYGEN SATURATION: 98 % | HEIGHT: 64 IN

## 2020-08-19 DIAGNOSIS — B37.2 YEAST INFECTION OF THE SKIN: ICD-10-CM

## 2020-08-19 DIAGNOSIS — Z76.89 ENCOUNTER FOR INCISION AND DRAINAGE PROCEDURE: ICD-10-CM

## 2020-08-19 DIAGNOSIS — Z51.89 WOUND CHECK, ABSCESS: Primary | ICD-10-CM

## 2020-08-19 DIAGNOSIS — Z48.00 ABSCESS PACKING REMOVAL: ICD-10-CM

## 2020-08-19 DIAGNOSIS — Z09 ENCOUNTER FOR RECHECK OF ABSCESS FOLLOWING INCISION AND DRAINAGE: ICD-10-CM

## 2020-08-19 PROCEDURE — 25000003 PHARM REV CODE 250: Mod: ER | Performed by: EMERGENCY MEDICINE

## 2020-08-19 PROCEDURE — 10061 I&D ABSCESS COMP/MULTIPLE: CPT | Mod: ER

## 2020-08-19 PROCEDURE — 99284 EMERGENCY DEPT VISIT MOD MDM: CPT | Mod: 25,ER

## 2020-08-19 RX ORDER — LIDOCAINE HYDROCHLORIDE AND EPINEPHRINE 10; 10 MG/ML; UG/ML
1 INJECTION, SOLUTION INFILTRATION; PERINEURAL ONCE
Status: COMPLETED | OUTPATIENT
Start: 2020-08-19 | End: 2020-08-19

## 2020-08-19 RX ORDER — HYDROCODONE BITARTRATE AND ACETAMINOPHEN 10; 325 MG/1; MG/1
1 TABLET ORAL EVERY 6 HOURS PRN
Qty: 12 TABLET | Refills: 0 | Status: SHIPPED | OUTPATIENT
Start: 2020-08-19 | End: 2020-08-21 | Stop reason: SDUPTHER

## 2020-08-19 RX ORDER — NYSTATIN 100000 U/G
OINTMENT TOPICAL 2 TIMES DAILY
Qty: 30 G | Refills: 0 | Status: SHIPPED | OUTPATIENT
Start: 2020-08-19 | End: 2020-10-14

## 2020-08-19 RX ORDER — HYDROCODONE BITARTRATE AND ACETAMINOPHEN 10; 325 MG/1; MG/1
1 TABLET ORAL
Status: COMPLETED | OUTPATIENT
Start: 2020-08-19 | End: 2020-08-19

## 2020-08-19 RX ADMIN — HYDROCODONE BITARTRATE AND ACETAMINOPHEN 1 TABLET: 10; 325 TABLET ORAL at 11:08

## 2020-08-19 RX ADMIN — LIDOCAINE HYDROCHLORIDE AND EPINEPHRINE 1 ML: 10; 10 INJECTION, SOLUTION INFILTRATION; PERINEURAL at 11:08

## 2020-08-19 NOTE — DISCHARGE INSTRUCTIONS
Packing removal in 48 hr.    Driving or other activities under influence of medications - Patient and/or family/caretaker was given a prescription for, or instructed to use a medicine that may impair ability to drive, operate machinery, or participate in other potentially dangerous activities.  Patient was instructed not to participate in these activities while under the influence of these medications.
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
## 2020-08-21 ENCOUNTER — OFFICE VISIT (OUTPATIENT)
Dept: INTERNAL MEDICINE | Facility: CLINIC | Age: 50
End: 2020-08-21
Payer: MEDICAID

## 2020-08-21 VITALS
HEIGHT: 64 IN | OXYGEN SATURATION: 97 % | BODY MASS INDEX: 32.29 KG/M2 | HEART RATE: 93 BPM | SYSTOLIC BLOOD PRESSURE: 122 MMHG | DIASTOLIC BLOOD PRESSURE: 80 MMHG | TEMPERATURE: 98 F | WEIGHT: 189.13 LBS

## 2020-08-21 DIAGNOSIS — L02.211 ABSCESS OF SKIN OF ABDOMEN: ICD-10-CM

## 2020-08-21 DIAGNOSIS — L02.415 ABSCESS OF RIGHT THIGH: ICD-10-CM

## 2020-08-21 DIAGNOSIS — L02.415 ABSCESS OF RIGHT LOWER LEG: Primary | ICD-10-CM

## 2020-08-21 PROCEDURE — 99214 OFFICE O/P EST MOD 30 MIN: CPT | Mod: PBBFAC,PO | Performed by: NURSE PRACTITIONER

## 2020-08-21 PROCEDURE — 99999 PR PBB SHADOW E&M-EST. PATIENT-LVL IV: ICD-10-PCS | Mod: PBBFAC,,, | Performed by: NURSE PRACTITIONER

## 2020-08-21 PROCEDURE — 99999 PR PBB SHADOW E&M-EST. PATIENT-LVL IV: CPT | Mod: PBBFAC,,, | Performed by: NURSE PRACTITIONER

## 2020-08-21 PROCEDURE — 99214 OFFICE O/P EST MOD 30 MIN: CPT | Mod: S$PBB,,, | Performed by: NURSE PRACTITIONER

## 2020-08-21 PROCEDURE — 99214 PR OFFICE/OUTPT VISIT, EST, LEVL IV, 30-39 MIN: ICD-10-PCS | Mod: S$PBB,,, | Performed by: NURSE PRACTITIONER

## 2020-08-21 RX ORDER — HYDROCODONE BITARTRATE AND ACETAMINOPHEN 10; 325 MG/1; MG/1
1 TABLET ORAL EVERY 6 HOURS PRN
Qty: 12 TABLET | Refills: 0 | Status: SHIPPED | OUTPATIENT
Start: 2020-08-21 | End: 2020-08-21

## 2020-08-21 RX ORDER — SULFAMETHOXAZOLE AND TRIMETHOPRIM 800; 160 MG/1; MG/1
1 TABLET ORAL 2 TIMES DAILY
Qty: 14 TABLET | Refills: 0 | Status: SHIPPED | OUTPATIENT
Start: 2020-08-21 | End: 2020-08-21

## 2020-08-21 RX ORDER — MUPIROCIN 20 MG/G
OINTMENT TOPICAL 3 TIMES DAILY
Qty: 22 G | Refills: 0 | Status: SHIPPED | OUTPATIENT
Start: 2020-08-21 | End: 2020-10-20

## 2020-08-21 RX ORDER — TRAMADOL HYDROCHLORIDE 50 MG/1
50 TABLET ORAL EVERY 6 HOURS
Qty: 30 TABLET | Refills: 0 | Status: SHIPPED | OUTPATIENT
Start: 2020-08-21 | End: 2020-10-14

## 2020-08-21 RX ORDER — SULFAMETHOXAZOLE AND TRIMETHOPRIM 800; 160 MG/1; MG/1
1 TABLET ORAL 2 TIMES DAILY
Qty: 14 TABLET | Refills: 0 | Status: SHIPPED | OUTPATIENT
Start: 2020-08-21 | End: 2020-08-28

## 2020-08-21 NOTE — PROGRESS NOTES
Subjective:       Patient ID: Jael Olguin is a 50 y.o. female.    Chief Complaint: Wound Check (RT leg)    Mrs. Olguin presents to clinic from ED follow up visits for multiple abscesses. She has abscess to R suprapubic area, R medial thigh, and R medial lower leg. She has had these wounds I&D'd and packed in ED on 8/17/2020 and 8/19/2020. She is currently taking bactrim. Tolerating abx well. She was also sent home with [place] for pain control.     Wound Check  She was originally treated 3 to 5 days ago (Monday). Previous treatment included I&D of abscess and oral antibiotics. The maximum temperature noted was less than 100.4 F. The redness has improved. The swelling has improved. The pain has not changed. She has no difficulty moving the affected extremity or digit.                   Patient Active Problem List   Diagnosis    Depression    Obesity, Class I, BMI 30-34.9    Cough    Tobacco use    Cellulitis    Abscess of right lower leg    Abscess of skin of abdomen    Abscess of right thigh       Family History   Problem Relation Age of Onset    Breast cancer Mother 65        Stage II S/p BL Mastectomy    No Known Problems Father      Past Surgical History:   Procedure Laterality Date    WISDOM TOOTH EXTRACTION           Current Outpatient Medications:     mupirocin (BACTROBAN) 2 % ointment, Apply topically 3 (three) times daily., Disp: 22 g, Rfl: 0    nystatin (MYCOSTATIN) ointment, Apply topically 2 (two) times daily. (Patient not taking: Reported on 8/21/2020), Disp: 30 g, Rfl: 0    traMADoL (ULTRAM) 50 mg tablet, Take 1 tablet (50 mg total) by mouth every 6 (six) hours., Disp: 30 tablet, Rfl: 0    Review of Systems   Constitutional: Negative for appetite change, chills, diaphoresis, fatigue and fever.   Respiratory: Negative for shortness of breath.    Gastrointestinal: Negative for nausea and vomiting.   Musculoskeletal: Negative for gait problem, joint swelling and  "myalgias.   Skin: Positive for color change and wound.   Neurological: Negative for dizziness, light-headedness and headaches.       Objective:   /80 (BP Location: Left arm, Patient Position: Sitting, BP Method: Large (Manual))   Pulse 93   Temp 97.5 °F (36.4 °C)   Ht 5' 4" (1.626 m)   Wt 85.8 kg (189 lb 2.5 oz)   LMP 08/03/2020   SpO2 97%   BMI 32.47 kg/m²      Physical Exam  Constitutional:       General: She is not in acute distress.     Appearance: Normal appearance. She is obese. She is not ill-appearing.   HENT:      Head: Normocephalic and atraumatic.   Neck:      Musculoskeletal: Normal range of motion and neck supple.   Cardiovascular:      Rate and Rhythm: Normal rate and regular rhythm.      Pulses: Normal pulses.      Heart sounds: Normal heart sounds. No murmur. No friction rub. No gallop.    Pulmonary:      Effort: Pulmonary effort is normal. No respiratory distress.      Breath sounds: Normal breath sounds.   Skin:     General: Skin is warm and dry.      Findings: Abscess and erythema present.      Comments: See images in HPI. Abscess with packing to R medial lower leg, R upper thigh, and R panis/suprapubic area. Erythema seems to have improved from previous images shown by pt, and according to outline with skin marker. Skin packing removed with purulent bloody drainage. No other expressible drainage present after removal of packing. TTP.    Neurological:      General: No focal deficit present.      Mental Status: She is alert and oriented to person, place, and time.         Assessment & Plan     Problem List Items Addressed This Visit        ID    Abscess of right lower leg - Primary    Current Assessment & Plan     Appears to be healing appropriately. Sending another week of abx due to multiple sites and size of upper leg and suprapubic abscess. Mupirocin at site also. Continue wound care. Pt tolerated packing removal well.          Relevant Medications    mupirocin (BACTROBAN) 2 % " "ointment    traMADoL (ULTRAM) 50 mg tablet    Abscess of skin of abdomen    Relevant Medications    mupirocin (BACTROBAN) 2 % ointment    traMADoL (ULTRAM) 50 mg tablet    Abscess of right thigh    Relevant Medications    mupirocin (BACTROBAN) 2 % ointment           Follow up in about 3 days (around 8/24/2020), or if symptoms worsen or fail to improve.          Portions of this note may have been created with voice recognition software. Occasional "wrong-word" or "sound-a-like" substitutions may have occurred due to the inherent limitations of voice recognition software. Please, read the note carefully and recognize, using context, where substitutions have occurred.       "

## 2020-08-21 NOTE — PATIENT INSTRUCTIONS
Abscess (Incision & Drainage)  An abscess is sometimes called a boil. It happens when bacteria get trapped under the skin and start to grow. Pus forms inside the abscess as the body responds to the bacteria. An abscess can happen with an insect bite, ingrown hair, blocked oil gland, pimple, cyst, or puncture wound.  Your healthcare provider has drained the pus from your abscess. If the abscess pocket was large, your healthcare provider may have put in gauze packing. Your provider will need to remove it on your next visit. He or she may also replace it at that time. You may not need antibiotics to treat a simple abscess, unless the infection is spreading into the skin around the wound (cellulitis).  The wound will take about 1 to 2 weeks to heal, depending on the size of the abscess. Healthy tissue will grow from the bottom and sides of the opening until it seals over.  Home care  These tips can help your wound heal:  · The wound may drain for the first 2 days. Cover the wound with a clean dry dressing. Change the dressing if it becomes soaked with blood or pus.  · If a gauze packing was placed inside the abscess pocket, you may be told to remove it yourself. You may do this in the shower. Once the packing is removed, you should wash the area in the shower, or clean the area as directed by your provider. Continue to do this until the skin opening has closed. Make sure you wash your hands after changing the packing or cleaning the wound.  · If you were prescribed antibiotics, take them as directed until they are all gone.  · You may use acetaminophen or ibuprofen to control pain, unless another pain medicine was prescribed. If you have liver disease or ever had a stomach ulcer, talk with your doctor before using these medicines.  Follow-up care  Follow up with your healthcare provider, or as advised. If a gauze packing was put in your wound, it should be removed in 1 to 2 days. Check your wound every day for any  signs that the infection is getting worse. The signs are listed below.  When to seek medical advice  Call your healthcare provider right away if any of these occur:  · Increasing redness or swelling  · Red streaks in the skin leading away from the wound  · Increasing local pain or swelling  · Continued pus draining from the wound 2 days after treatment  · Fever of 100.4ºF (38ºC) or higher, or as directed by your healthcare provider  · Boil returns when you are at home  Date Last Reviewed: 9/1/2016  © 5426-2321 [employer]. [address] [place] [address]. All rights reserved. This information is not intended as a substitute for professional medical care. Always follow your healthcare professional's instructions.        Wound Care After Packing Removal or Replacement  Packing is a special type of dressing placed inside a wound to help it heal. Once the packing is removed, you need to care for your wound. Good wound care helps prevent infection. Be sure to keep all follow-up appointments with your healthcare provider. Follow these instructions to take care of the wound once youre at home.  Home care  Your healthcare provider may prescribe medicines for pain. Or he or she may suggest an over-the-counter (OTC) pain reliever, such as ibuprofen or acetaminophen. Talk with your healthcare provider before taking any OTC medicines if you have chronic liver or kidney disease, a stomach ulcer, or gastrointestinal bleeding. In certain cases, you may also need to take antibiotics to help prevent infection. If so, take them exactly as directed for as long as directed. Dont stop taking your antibiotics until they are all gone, even if you feel better.  Here are some general care guidelines for your wound:  · Follow your healthcare providers instructions on how to care for your wound. Always wash your hands with soap and warm water before and after tending to your wound.  · If a bandage was put on, remove  and change it once a day or as directed. If the bandage gets wet or dirty, replace it as soon as possible with a new bandage. Use a clean cloth to gently pat the wound dry.  · If your packing was replaced, a small piece of gauze may hang from the wound. It allows fluid to continue draining from the wound. You may need to use an ointment or cream to keep the packing from sticking to the bandage.  · Don't bathe in a tub or soak your wound until your healthcare provider says its OK. Take showers or sponge baths instead. Avoid swimming.  · Dont scratch, rub, scrub, or pick your wound.  · Check your wound daily for the signs of infection listed below.  If your wound was closed, it was likely with one of four types of closures. These include stitches (sutures), strips of surgical tape, skin glue, and staples. Your healthcare provider will decide on the best closure based on the size and location of your wound. Each type of closure needs specific care.  · Sutures. You may want to clean the wound daily after the first 2 to 3 days. To do this, remove the bandage and gently wash the area with soap and warm water. After cleaning, apply a thin layer of antibiotic ointment if recommended. Then put on a new bandage. Sutures on the outside of the skin usually need to be removed by your healthcare provider.  · Surgical tape. Keep the area dry. If it gets wet, blot it dry with a towel. Surgical tape closures usually fall off within 7 to 10 days. If they have not fallen off after 10 days, you can remove them yourself. To remove the tape, use mineral oil or petroleum jelly on a cotton ball to gently rub the adhesive.  · Skin glue. You may shower or bathe as usual. But do not use soaps, lotions, or ointments on the wound area. Do not scrub the wound. After bathing, pat the wound dry with a soft towel. Do not apply liquids like peroxide, ointments, or creams to the wound while the strips or film is in place. Don't scratch, rub, or  pick at the strips or film. Don't put tape directly over the strips or film. Skin adhesive film will fall off naturally in 5 to 10 days. If it does not peel off in 10 days, gently rub petroleum jelly or an ointment onto the film.  · Staples. Take showers or sponge baths. Don't take tub baths. Don't use lotions on the wound area. The area may be cleaned with soap and water 2 to 3 days after the wound was stapled. Don't scrub the wound. Pat it dry with a clean soft cloth or towel. You can use antibiotic ointment if your healthcare provider tells you to. Staples will need to be removed in 10 to 14 days.  Follow-up care  Follow up with your healthcare provider, or as directed. If your packing was replaced, you may need another visit within 1 to 3 days to remove or replace it. If you have sutures or staples, return for their removal as directed.  When to seek medical advice  Call your health care provider right away if you have signs of infection:  · Fever of 1 degree or more above your normal temperature, or as directed by your healthcare provider  · Increasing pain in the wound or pain that doesnt get better even with pain medicine  · Increasing redness or swelling  · Pus or bad-smelling drainage from the wound  Also call your healthcare provider right away if any of these occur:  · Your wound bleeds more than a small amount or wont stop bleeding.  · The edges of your wound come apart.  · You have numbness or weakness in the wound area that doesnt go away.  Date Last Reviewed: 10/2/2015  © 2502-3717 [employer]. [address] [place] [address], [place] [address]. All rights reserved. This information is not intended as a substitute for professional medical care. Always follow your healthcare professional's instructions.

## 2020-08-31 PROBLEM — L02.415 ABSCESS OF RIGHT LOWER LEG: Status: ACTIVE | Noted: 2020-08-31

## 2020-08-31 PROBLEM — L02.211 ABSCESS OF SKIN OF ABDOMEN: Status: ACTIVE | Noted: 2020-08-31

## 2020-08-31 PROBLEM — L02.415 ABSCESS OF RIGHT THIGH: Status: ACTIVE | Noted: 2020-08-31

## 2020-08-31 NOTE — ASSESSMENT & PLAN NOTE
Appears to be healing appropriately. Sending another week of abx due to multiple sites and size of upper leg and suprapubic abscess. Mupirocin at site also. Continue wound care. Pt tolerated packing removal well.

## 2020-09-17 ENCOUNTER — PATIENT MESSAGE (OUTPATIENT)
Dept: FAMILY MEDICINE | Facility: CLINIC | Age: 50
End: 2020-09-17

## 2020-09-17 DIAGNOSIS — L02.415 ABSCESS OF RIGHT LOWER LEG: ICD-10-CM

## 2020-09-17 DIAGNOSIS — L02.211 ABSCESS OF SKIN OF ABDOMEN: ICD-10-CM

## 2020-09-17 RX ORDER — TRAMADOL HYDROCHLORIDE 50 MG/1
50 TABLET ORAL EVERY 6 HOURS
Qty: 30 TABLET | Refills: 0 | OUTPATIENT
Start: 2020-09-17

## 2020-10-09 DIAGNOSIS — Z12.11 COLON CANCER SCREENING: Primary | ICD-10-CM

## 2020-10-14 ENCOUNTER — HOSPITAL ENCOUNTER (EMERGENCY)
Facility: HOSPITAL | Age: 50
Discharge: HOME | End: 2020-10-14
Attending: EMERGENCY MEDICINE
Payer: MEDICAID

## 2020-10-14 VITALS
TEMPERATURE: 98 F | SYSTOLIC BLOOD PRESSURE: 139 MMHG | BODY MASS INDEX: 32.21 KG/M2 | HEART RATE: 97 BPM | WEIGHT: 188.69 LBS | DIASTOLIC BLOOD PRESSURE: 74 MMHG | HEIGHT: 64 IN | OXYGEN SATURATION: 99 % | RESPIRATION RATE: 16 BRPM

## 2020-10-14 DIAGNOSIS — M54.41 ACUTE RIGHT-SIDED LOW BACK PAIN WITH RIGHT-SIDED SCIATICA: Primary | ICD-10-CM

## 2020-10-14 PROCEDURE — 63600175 PHARM REV CODE 636 W HCPCS: Mod: ER | Performed by: NURSE PRACTITIONER

## 2020-10-14 PROCEDURE — 96372 THER/PROPH/DIAG INJ SC/IM: CPT | Mod: ER

## 2020-10-14 PROCEDURE — 99284 EMERGENCY DEPT VISIT MOD MDM: CPT | Mod: 25,ER

## 2020-10-14 PROCEDURE — 25000003 PHARM REV CODE 250: Mod: ER | Performed by: NURSE PRACTITIONER

## 2020-10-14 RX ORDER — HYDROCODONE BITARTRATE AND ACETAMINOPHEN 10; 325 MG/1; MG/1
1 TABLET ORAL
Status: DISCONTINUED | OUTPATIENT
Start: 2020-10-14 | End: 2020-10-14

## 2020-10-14 RX ORDER — NAPROXEN SODIUM 550 MG/1
550 TABLET ORAL 2 TIMES DAILY WITH MEALS
Qty: 20 TABLET | Refills: 0 | Status: SHIPPED | OUTPATIENT
Start: 2020-10-14 | End: 2020-10-24

## 2020-10-14 RX ORDER — HYDROCODONE BITARTRATE AND ACETAMINOPHEN 5; 325 MG/1; MG/1
1 TABLET ORAL
Status: COMPLETED | OUTPATIENT
Start: 2020-10-14 | End: 2020-10-14

## 2020-10-14 RX ORDER — CYCLOBENZAPRINE HCL 5 MG
5 TABLET ORAL 3 TIMES DAILY PRN
Qty: 21 TABLET | Refills: 0 | Status: SHIPPED | OUTPATIENT
Start: 2020-10-14 | End: 2020-10-20 | Stop reason: SDUPTHER

## 2020-10-14 RX ORDER — CYCLOBENZAPRINE HCL 10 MG
10 TABLET ORAL
Status: COMPLETED | OUTPATIENT
Start: 2020-10-14 | End: 2020-10-14

## 2020-10-14 RX ORDER — KETOROLAC TROMETHAMINE 30 MG/ML
30 INJECTION, SOLUTION INTRAMUSCULAR; INTRAVENOUS
Status: COMPLETED | OUTPATIENT
Start: 2020-10-14 | End: 2020-10-14

## 2020-10-14 RX ADMIN — CYCLOBENZAPRINE HYDROCHLORIDE 10 MG: 10 TABLET, FILM COATED ORAL at 02:10

## 2020-10-14 RX ADMIN — HYDROCODONE BITARTRATE AND ACETAMINOPHEN 1 TABLET: 5; 325 TABLET ORAL at 02:10

## 2020-10-14 RX ADMIN — KETOROLAC TROMETHAMINE 30 MG: 30 INJECTION, SOLUTION INTRAMUSCULAR; INTRAVENOUS at 02:10
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
## 2020-10-14 NOTE — ED NOTES
"Patient states "I know I have bulging disk, but I haven't had an MRI in years, I always have lower back pain, but nothing like this, on a daily basis."  "

## 2020-10-20 ENCOUNTER — OFFICE VISIT (OUTPATIENT)
Dept: INTERNAL MEDICINE | Facility: CLINIC | Age: 50
End: 2020-10-20
Payer: MEDICAID

## 2020-10-20 ENCOUNTER — HOSPITAL ENCOUNTER (OUTPATIENT)
Dept: RADIOLOGY | Facility: HOSPITAL | Age: 50
Discharge: HOME | End: 2020-10-20
Attending: NURSE PRACTITIONER
Payer: MEDICAID

## 2020-10-20 VITALS
SYSTOLIC BLOOD PRESSURE: 112 MMHG | OXYGEN SATURATION: 99 % | WEIGHT: 190.5 LBS | HEART RATE: 96 BPM | DIASTOLIC BLOOD PRESSURE: 72 MMHG | TEMPERATURE: 98 F | HEIGHT: 64 IN | BODY MASS INDEX: 32.52 KG/M2

## 2020-10-20 DIAGNOSIS — G89.29 CHRONIC NECK PAIN: ICD-10-CM

## 2020-10-20 DIAGNOSIS — M54.2 CHRONIC NECK PAIN: ICD-10-CM

## 2020-10-20 DIAGNOSIS — M54.12 CERVICAL RADICULOPATHY: Primary | ICD-10-CM

## 2020-10-20 DIAGNOSIS — M54.41 ACUTE RIGHT-SIDED LOW BACK PAIN WITH RIGHT-SIDED SCIATICA: ICD-10-CM

## 2020-10-20 PROBLEM — L02.415 ABSCESS OF RIGHT THIGH: Status: RESOLVED | Noted: 2020-08-31 | Resolved: 2020-10-20

## 2020-10-20 PROBLEM — L02.415 ABSCESS OF RIGHT LOWER LEG: Status: RESOLVED | Noted: 2020-08-31 | Resolved: 2020-10-20

## 2020-10-20 PROBLEM — L02.211 ABSCESS OF SKIN OF ABDOMEN: Status: RESOLVED | Noted: 2020-08-31 | Resolved: 2020-10-20

## 2020-10-20 PROBLEM — R05.9 COUGH: Status: RESOLVED | Noted: 2020-05-04 | Resolved: 2020-10-20

## 2020-10-20 PROBLEM — L03.90 CELLULITIS: Status: RESOLVED | Noted: 2020-08-17 | Resolved: 2020-10-20

## 2020-10-20 PROCEDURE — 99214 PR OFFICE/OUTPT VISIT, EST, LEVL IV, 30-39 MIN: ICD-10-PCS | Mod: S$PBB,,, | Performed by: NURSE PRACTITIONER

## 2020-10-20 PROCEDURE — 99999 PR PBB SHADOW E&M-EST. PATIENT-LVL IV: CPT | Mod: PBBFAC,,, | Performed by: NURSE PRACTITIONER

## 2020-10-20 PROCEDURE — 72040 X-RAY EXAM NECK SPINE 2-3 VW: CPT | Mod: 26,,, | Performed by: RADIOLOGY

## 2020-10-20 PROCEDURE — 99214 OFFICE O/P EST MOD 30 MIN: CPT | Mod: S$PBB,,, | Performed by: NURSE PRACTITIONER

## 2020-10-20 PROCEDURE — 99214 OFFICE O/P EST MOD 30 MIN: CPT | Mod: PBBFAC,25,PO | Performed by: NURSE PRACTITIONER

## 2020-10-20 PROCEDURE — 99999 PR PBB SHADOW E&M-EST. PATIENT-LVL IV: ICD-10-PCS | Mod: PBBFAC,,, | Performed by: NURSE PRACTITIONER

## 2020-10-20 PROCEDURE — 72040 X-RAY EXAM NECK SPINE 2-3 VW: CPT | Mod: TC,PO

## 2020-10-20 PROCEDURE — 72040 XR CERVICAL SPINE AP LATERAL: ICD-10-PCS | Mod: 26,,, | Performed by: RADIOLOGY

## 2020-10-20 RX ORDER — IBUPROFEN 800 MG/1
800 TABLET ORAL 3 TIMES DAILY PRN
Qty: 30 TABLET | Refills: 0 | Status: SHIPPED | OUTPATIENT
Start: 2020-10-20 | End: 2020-10-27 | Stop reason: SDUPTHER

## 2020-10-20 RX ORDER — CYCLOBENZAPRINE HCL 5 MG
5 TABLET ORAL 3 TIMES DAILY PRN
Qty: 21 TABLET | Refills: 0 | Status: SHIPPED | OUTPATIENT
Start: 2020-10-20 | End: 2020-10-27 | Stop reason: SDUPTHER

## 2020-10-20 RX ORDER — METHYLPREDNISOLONE 4 MG/1
TABLET ORAL
Qty: 21 TABLET | Refills: 0 | Status: SHIPPED | OUTPATIENT
Start: 2020-10-20 | End: 2020-11-12 | Stop reason: ALTCHOICE

## 2020-10-20 NOTE — PATIENT INSTRUCTIONS
Self-Care for Low Back Pain    Most people have low back pain now and then. In many cases, it isnt serious and self-care can help. Sometimes low back pain can be a sign of a bigger problem. Call your healthcare provider if your pain returns often or gets worse over time. For the long-term care of your back, get regular exercise, lose any excess weight and learn good posture.  Take a short rest  Lying down during the day may be beneficial for short periods of time if severe pain increases with sitting or standing. Long-term bed rest could be detrimental.  Reduce pain and swelling  Cold reduces swelling. Both cold and heat can reduce pain. Protect your skin by placing a towel between your body and the ice or heat source.  · For the first few days, apply an ice pack for 15 to 20 minutes .  · After the first few days, try heat for 15 minutes at a time to ease pain. Never sleep on a heating pad.  · Over-the-counter medicine can help control pain and swelling. Try aspirin or ibuprofen.  Exercise  Exercise can help your back heal. It also helps your back get stronger and more flexible, preventing any reinjury. Ask your healthcare provider about specific exercises for your back.  Use good posture to avoid reinjury  · When moving, bend at the hips and knees. Dont bend at the waist or twist around.  · When lifting, keep the object close to your body. Dont try to lift more than you can handle.  · When sitting, keep your lower back supported. Use a rolled-up towel as needed.  Seek immediate medical care if:  · Youre unable to stand or walk.  · You have a temperature over 100.4°F (38.0°C)  · You have frequent, painful, or bloody urination.  · You have severe abdominal pain.  · You have a sharp, stabbing pain.  · Your pain is constant.  · You have pain or numbness in your leg.  · You feel pain in a new area of your back.  · You notice that the pain isnt decreasing after more than a week.   Date Last Reviewed: 9/29/2015  ©  8636-9434 [employer]. [address] [place] [address]. All rights reserved. This information is not intended as a substitute for professional medical care. Always follow your healthcare professional's instructions.        Relieving Back Pain  Back pain is a common problem. You can strain back muscles by lifting too much weight or just by moving the wrong way. Back strain can be uncomfortable, even painful. And it can take weeks or months to improve. To help yourself feel better and prevent future back strains, try these tips.  Important Note: Do not give aspirin to children or teens without first discussing it with your healthcare provider.      ? Ice    Ice reduces muscle pain and swelling. It helps most during the first 24 to 48 hours after an injury.  · Wrap an ice pack or a bag of frozen peas in a thin towel. (Never place ice directly on your skin.)  · Place the ice where your back hurts the most.  · Dont ice for more than 20 minutes at a time.  · You can use ice several times a day.  ? Medicines  Over-the-counter pain relievers can include acetaminophen and anti-inflammatory medicines, which includes aspirin or ibuprofen. They can help ease discomfort. Some also reduce swelling.  · Tell your healthcare provider about any medicines you are already taking.  · Take medicines only as directed.  ? Heat  After the first 48 hours, heat can relax sore muscles and improve blood flow.  · Try a warm bath or shower. Or use a heating pad set on low. To prevent a burn, keep a cloth between you and the heating pad.  · Dont use a heating pad for more than 15 minutes at a time. Never sleep on a heating pad.  Date Last Reviewed: 9/1/2015  © 1439-0648 [employer]. [address] [place] [address]. All rights reserved. This information is not intended as a substitute for professional medical care. Always follow your healthcare professional's instructions.        Neck Exercises:  Active Neck Rotation    To start, lie on your back, knees bent and feet flat on the floor. Keep your ears, shoulders, and hips aligned, but dont press your lower back to the floor. Rest your hands on your pelvis. Breathe deeply and relax.  · Use your neck muscles to turn your head to one side until you feel a stretch in the muscles.  · Hold for 5 seconds. Then turn to the other side.  · Repeat 5 times on each side.  Note: Keep your shoulders on the floor. Dont lift or tuck your chin as you turn your head.  Date Last Reviewed: 8/16/2015 © 2000-2017 [employer]. [address] [place] [address]. All rights reserved. This information is not intended as a substitute for professional medical care. Always follow your healthcare professional's instructions.        Neck Exercises: Passive Neck Rotation    To start, lie on your back, knees bent and feet flat on the floor. Keep your ears, shoulders, and hips aligned, but dont press your lower back to the floor. Rest your hands on your pelvis. Breathe deeply and relax.  · With your neck relaxed, place the palm of one hand on your forehead. Use your hand to turn your head to one side until you feel a stretch in the neck muscles. Do not push through pain.  · Hold for 5 seconds. Then turn to the other side.  · Repeat 5 times on each side.   Note: Keep your shoulders on the floor. Dont lift your chin as you turn your head.  Date Last Reviewed: 8/16/2015 © 2000-2017 [employer]. [address] [place] [address]. All rights reserved. This information is not intended as a substitute for professional medical care. Always follow your healthcare professional's instructions.        Exercises: Neck Isometrics  To start, sit in a chair with your feet flat on the floor. Your weight should be slightly forward so that youre balanced evenly on your buttocks. Relax your shoulders and keep your head level. Using a chair with arms may help you  keep your balance.       1. Press your palm against your forehead. Resist with your neck muscles. Hold for 10 seconds. Relax. Repeat 5 times.  2. Do the exercise again, pressing on the side of your head. Repeat 5 times. Switch sides.  3. Do the exercise again, pressing on the back of your head. Repeat 5 times.  For your safety, check with your healthcare provider before starting an exercise program.   Date Last Reviewed: 8/16/2015  © 8336-4687 [employer]. [address] [place] [address]. All rights reserved. This information is not intended as a substitute for professional medical care. Always follow your healthcare professional's instructions.        Back Exercises: Leg Pull    To start, lie on your back with your knees bent and feet flat on the floor. Dont press your neck or lower back to the floor. Breathe deeply. You should feel comfortable and relaxed in this position.  · Pull one knee to your chest.  · Hold for 30 to 60 seconds. Return to starting position.  · Repeat 2 times.  · Switch legs.  · For a double leg pull, pull both legs to your chest at the same time. Repeat 2 times.  For your safety, check with your healthcare provider before starting an exercise program.   Date Last Reviewed: 8/16/2015 © 2000-2017 [employer]. [address] [place] [address]. All rights reserved. This information is not intended as a substitute for professional medical care. Always follow your healthcare professional's instructions.        Back Exercises: Lower Back Rotation    To start, lie on your back with your knees bent and feet flat on the floor. Dont press your neck or lower back to the floor. Breathe deeply. You should feel comfortable and relaxed in this position.  · Drop both knees to one side. Turn your head to the other side. Keep your shoulders flat on the floor.  · Do not push through pain.  · Hold for 20 seconds.  · Slowly switch sides.  · Repeat 2 to  5 times.  Date Last Reviewed: 10/11/2015  © 5819-5203 [employer]. [address] [place] [address], [place] [address]. All rights reserved. This information is not intended as a substitute for professional medical care. Always follow your healthcare professional's instructions.

## 2020-10-20 NOTE — ASSESSMENT & PLAN NOTE
NSAID  Muscle relaxer  Stretches  Will send steroid pack since no relief with nsaids since ER visit.

## 2020-10-20 NOTE — ASSESSMENT & PLAN NOTE
NSAID  Muscle relaxer  Stretches  Will send steroid pack since no relief with nsaids since ER visit.   Cervical imaging today to compare to previous imaging.

## 2020-10-20 NOTE — PROGRESS NOTES
Subjective:       Patient ID: Jael Olguin is a 50 y.o. female.    Chief Complaint: Neck Pain and Back Pain    Mrs. Olguin presents to clinic with complaints for neck pain and R sided lower back pain.  She reports that neck pain has been going on intermittently for 2-3 years.  She reports having an x-ray several years ago and was given anti-inflammatory in symptoms improved.  She now feels like she has a knot to the left side of her posterior neck, but continues to have pain that radiates down right and left side of neck.  Denies any radiation into bilateral arms.    She is also complaining of right-sided lower back and leg pain over past week.  She reports feeling like her leg was going to give out on her a week ago while walking in store, and has been having constant lower back, right hip, and right leg pain.  She was seen in the ER on 10/14/2020, and was sent home with naproxen, Flexeril, and given a Toradol injection and Echo in the ER.  No imaging done there.  She denies any relief with the naproxen.    Neck Pain   This is a chronic problem. The current episode started more than 1 year ago (2-3 years, worsening). The problem occurs intermittently. The problem has been gradually worsening. The pain is associated with nothing. The pain is present in the left side and right side. The quality of the pain is described as shooting and burning. The pain is at a severity of 8/10. The symptoms are aggravated by twisting and position. Associated symptoms include leg pain, numbness and tingling. Pertinent negatives include no chest pain, fever, headaches or weakness. She has tried muscle relaxants, NSAIDs, home exercises and heat for the symptoms. The treatment provided mild relief.   Back Pain  This is a new problem. The current episode started in the past 7 days. The problem occurs constantly. The problem has been waxing and waning since onset. The pain is present in the lumbar spine. The  quality of the pain is described as shooting. Radiates to: R leg. The pain is at a severity of 7/10. The pain is moderate. Associated symptoms include leg pain, numbness, paresthesias and tingling. Pertinent negatives include no abdominal pain, bladder incontinence, bowel incontinence, chest pain, dysuria, fever, headaches, pelvic pain or weakness. Risk factors include obesity. She has tried NSAIDs, muscle relaxant, heat and ice (stretches) for the symptoms. The treatment provided mild relief.       Patient Active Problem List   Diagnosis    Depression    Obesity, Class I, BMI 30-34.9    Tobacco use    Cervical radiculopathy    Acute right-sided low back pain with right-sided sciatica    Chronic neck pain       Family History   Problem Relation Age of Onset    Breast cancer Mother 65        Stage II S/p BL Mastectomy    No Known Problems Father      Past Surgical History:   Procedure Laterality Date    WISDOM TOOTH EXTRACTION           Current Outpatient Medications:     cyclobenzaprine (FLEXERIL) 5 MG tablet, Take 1 tablet (5 mg total) by mouth 3 (three) times daily as needed for Muscle spasms., Disp: 21 tablet, Rfl: 0    naproxen sodium (ANAPROX) 550 MG tablet, Take 1 tablet (550 mg total) by mouth 2 (two) times daily with meals. for 10 days, Disp: 20 tablet, Rfl: 0    ibuprofen (ADVIL,MOTRIN) 800 MG tablet, Take 1 tablet (800 mg total) by mouth 3 (three) times daily as needed for Pain., Disp: 30 tablet, Rfl: 0    methylPREDNISolone (MEDROL DOSEPACK) 4 mg tablet, use as directed, Disp: 21 tablet, Rfl: 0    Review of Systems   Constitutional: Negative for chills, fatigue and fever.   Eyes: Negative for visual disturbance.   Respiratory: Negative for shortness of breath.    Cardiovascular: Negative for chest pain and palpitations.   Gastrointestinal: Negative for abdominal pain and bowel incontinence.   Genitourinary: Negative for bladder incontinence, dysuria and pelvic pain.   Musculoskeletal:  "Positive for back pain, myalgias and neck pain. Negative for gait problem.   Neurological: Positive for tingling, numbness and paresthesias. Negative for dizziness, syncope, weakness, light-headedness and headaches.       Objective:   /72   Pulse 96   Temp 97.7 °F (36.5 °C) (Temporal)   Ht 5' 4" (1.626 m)   Wt 86.4 kg (190 lb 7.6 oz)   LMP 09/17/2020   SpO2 99%   BMI 32.70 kg/m²      Physical Exam  Constitutional:       General: She is not in acute distress.     Appearance: Normal appearance. She is not ill-appearing.   HENT:      Head: Normocephalic and atraumatic.   Neck:      Musculoskeletal: Normal range of motion and neck supple. Muscular tenderness present.   Cardiovascular:      Rate and Rhythm: Normal rate and regular rhythm.      Pulses: Normal pulses.      Heart sounds: Normal heart sounds. No murmur. No friction rub. No gallop.    Pulmonary:      Effort: Pulmonary effort is normal. No respiratory distress.      Breath sounds: Normal breath sounds.   Abdominal:      Palpations: Abdomen is soft.      Tenderness: There is no abdominal tenderness.   Musculoskeletal:      Cervical back: She exhibits tenderness (bilateral muscular neck and trapezius TTP). She exhibits normal range of motion and no bony tenderness.      Lumbar back: She exhibits decreased range of motion and tenderness. She exhibits no bony tenderness.      Right lower leg: No edema.      Left lower leg: No edema.      Comments: +SLT on R side, +pain with external and internal of R hip. Negative exam on L side.   Lymphadenopathy:      Cervical: No cervical adenopathy.   Skin:     General: Skin is warm and dry.      Coloration: Skin is not pale.      Findings: No erythema.   Neurological:      General: No focal deficit present.      Mental Status: She is alert and oriented to person, place, and time.         Assessment & Plan     Problem List Items Addressed This Visit        Neuro    Cervical radiculopathy - Primary    Current " "Assessment & Plan     NSAID  Muscle relaxer  Stretches  Will send steroid pack since no relief with nsaids since ER visit.   Cervical imaging today to compare to previous imaging.          Relevant Medications    methylPREDNISolone (MEDROL DOSEPACK) 4 mg tablet    ibuprofen (ADVIL,MOTRIN) 800 MG tablet    cyclobenzaprine (FLEXERIL) 5 MG tablet       Orthopedic    Acute right-sided low back pain with right-sided sciatica    Current Assessment & Plan     NSAID  Muscle relaxer  Stretches  Will send steroid pack since no relief with nsaids since ER visit.          Relevant Medications    ibuprofen (ADVIL,MOTRIN) 800 MG tablet    cyclobenzaprine (FLEXERIL) 5 MG tablet    Chronic neck pain    Relevant Medications    methylPREDNISolone (MEDROL DOSEPACK) 4 mg tablet        Follow up if symptoms worsen or fail to improve.            Portions of this note may have been created with voice recognition software. Occasional "wrong-word" or "sound-a-like" substitutions may have occurred due to the inherent limitations of voice recognition software. Please, read the note carefully and recognize, using context, where substitutions have occurred.       "

## 2020-10-27 ENCOUNTER — PATIENT MESSAGE (OUTPATIENT)
Dept: INTERNAL MEDICINE | Facility: CLINIC | Age: 50
End: 2020-10-27

## 2020-10-27 DIAGNOSIS — M54.41 ACUTE RIGHT-SIDED LOW BACK PAIN WITH RIGHT-SIDED SCIATICA: ICD-10-CM

## 2020-10-27 DIAGNOSIS — M54.12 CERVICAL RADICULOPATHY: ICD-10-CM

## 2020-10-27 RX ORDER — CYCLOBENZAPRINE HCL 5 MG
5 TABLET ORAL 3 TIMES DAILY PRN
Qty: 21 TABLET | Refills: 0 | Status: SHIPPED | OUTPATIENT
Start: 2020-10-27 | End: 2020-11-03 | Stop reason: SDUPTHER

## 2020-10-27 RX ORDER — IBUPROFEN 800 MG/1
800 TABLET ORAL 3 TIMES DAILY PRN
Qty: 30 TABLET | Refills: 0 | Status: SHIPPED | OUTPATIENT
Start: 2020-10-27 | End: 2020-11-11 | Stop reason: SDUPTHER

## 2020-11-03 ENCOUNTER — PATIENT OUTREACH (OUTPATIENT)
Dept: ADMINISTRATIVE | Facility: OTHER | Age: 50
End: 2020-11-03

## 2020-11-03 DIAGNOSIS — M54.12 CERVICAL RADICULOPATHY: ICD-10-CM

## 2020-11-03 DIAGNOSIS — M54.41 ACUTE RIGHT-SIDED LOW BACK PAIN WITH RIGHT-SIDED SCIATICA: ICD-10-CM

## 2020-11-03 RX ORDER — CYCLOBENZAPRINE HCL 5 MG
5 TABLET ORAL 3 TIMES DAILY PRN
Qty: 30 TABLET | Refills: 0 | Status: SHIPPED | OUTPATIENT
Start: 2020-11-03 | End: 2020-11-10

## 2020-11-11 ENCOUNTER — PATIENT MESSAGE (OUTPATIENT)
Dept: INTERNAL MEDICINE | Facility: CLINIC | Age: 50
End: 2020-11-11

## 2020-11-11 DIAGNOSIS — M54.12 CERVICAL RADICULOPATHY: ICD-10-CM

## 2020-11-11 DIAGNOSIS — M54.41 ACUTE RIGHT-SIDED LOW BACK PAIN WITH RIGHT-SIDED SCIATICA: ICD-10-CM

## 2020-11-11 RX ORDER — IBUPROFEN 800 MG/1
800 TABLET ORAL 3 TIMES DAILY PRN
Qty: 30 TABLET | Refills: 0 | Status: SHIPPED | OUTPATIENT
Start: 2020-11-11 | End: 2020-11-12 | Stop reason: ALTCHOICE

## 2020-11-12 ENCOUNTER — OFFICE VISIT (OUTPATIENT)
Dept: INTERNAL MEDICINE | Facility: CLINIC | Age: 50
End: 2020-11-12
Payer: MEDICAID

## 2020-11-12 VITALS
OXYGEN SATURATION: 98 % | WEIGHT: 191.13 LBS | DIASTOLIC BLOOD PRESSURE: 62 MMHG | HEART RATE: 94 BPM | SYSTOLIC BLOOD PRESSURE: 118 MMHG | TEMPERATURE: 98 F | BODY MASS INDEX: 32.63 KG/M2 | HEIGHT: 64 IN

## 2020-11-12 DIAGNOSIS — E66.9 OBESITY, CLASS I, BMI 30-34.9: ICD-10-CM

## 2020-11-12 DIAGNOSIS — M54.2 CHRONIC NECK PAIN: Primary | ICD-10-CM

## 2020-11-12 DIAGNOSIS — Z12.39 ENCOUNTER FOR SCREENING FOR MALIGNANT NEOPLASM OF BREAST, UNSPECIFIED SCREENING MODALITY: ICD-10-CM

## 2020-11-12 DIAGNOSIS — M54.42 CHRONIC BILATERAL LOW BACK PAIN WITH BILATERAL SCIATICA: ICD-10-CM

## 2020-11-12 DIAGNOSIS — Z11.59 ENCOUNTER FOR HEPATITIS C SCREENING TEST FOR LOW RISK PATIENT: ICD-10-CM

## 2020-11-12 DIAGNOSIS — M54.12 RADICULOPATHY, CERVICAL REGION: ICD-10-CM

## 2020-11-12 DIAGNOSIS — Z72.0 TOBACCO USE: ICD-10-CM

## 2020-11-12 DIAGNOSIS — G89.29 CHRONIC NECK PAIN: Primary | ICD-10-CM

## 2020-11-12 DIAGNOSIS — Z11.4 SCREENING FOR HIV WITHOUT PRESENCE OF RISK FACTORS: ICD-10-CM

## 2020-11-12 DIAGNOSIS — M54.41 CHRONIC BILATERAL LOW BACK PAIN WITH BILATERAL SCIATICA: ICD-10-CM

## 2020-11-12 DIAGNOSIS — G89.29 CHRONIC BILATERAL LOW BACK PAIN WITH BILATERAL SCIATICA: ICD-10-CM

## 2020-11-12 PROCEDURE — 99214 OFFICE O/P EST MOD 30 MIN: CPT | Mod: S$PBB,,, | Performed by: NURSE PRACTITIONER

## 2020-11-12 PROCEDURE — 99214 OFFICE O/P EST MOD 30 MIN: CPT | Mod: PBBFAC,PO,25 | Performed by: NURSE PRACTITIONER

## 2020-11-12 PROCEDURE — 99999 PR PBB SHADOW E&M-EST. PATIENT-LVL IV: CPT | Mod: PBBFAC,,, | Performed by: NURSE PRACTITIONER

## 2020-11-12 PROCEDURE — 99214 PR OFFICE/OUTPT VISIT, EST, LEVL IV, 30-39 MIN: ICD-10-PCS | Mod: S$PBB,,, | Performed by: NURSE PRACTITIONER

## 2020-11-12 PROCEDURE — 99999 PR PBB SHADOW E&M-EST. PATIENT-LVL IV: ICD-10-PCS | Mod: PBBFAC,,, | Performed by: NURSE PRACTITIONER

## 2020-11-12 PROCEDURE — 90686 IIV4 VACC NO PRSV 0.5 ML IM: CPT | Mod: PBBFAC,PO

## 2020-11-12 RX ORDER — MELOXICAM 15 MG/1
15 TABLET ORAL DAILY PRN
Qty: 30 TABLET | Refills: 0 | Status: SHIPPED | OUTPATIENT
Start: 2020-11-12 | End: 2020-12-30 | Stop reason: SINTOL

## 2020-11-12 RX ORDER — TIZANIDINE 4 MG/1
4 TABLET ORAL EVERY 8 HOURS PRN
Qty: 30 TABLET | Refills: 0 | Status: SHIPPED | OUTPATIENT
Start: 2020-11-12 | End: 2020-11-20 | Stop reason: SDUPTHER

## 2020-11-12 NOTE — ASSESSMENT & PLAN NOTE
Declined smoke cessation program at this time. Counseled on importance of smoke cessation in order to improve overall quality of life and reduce risk of future comorbidities.

## 2020-11-12 NOTE — PROGRESS NOTES
Subjective:       Patient ID: Jael Olguin is a 50 y.o. female.    Chief Complaint: Neck Pain and Hip Pain    Mrs. Olguin presents to clinic with complaints of continued L sided, posterior neck pain and R hip pain that is now radiating to L side. Pain has been intermittent for years. Pain has been worsening. No known injury or exacerbating event. Pain in neck has become more constant over past 4-5 months. She has been taking flexeril and ibuprofen for pain relief with only mild intermittent relief. Ibuprofen has been irritating her stomach since she has been taking regularly for pain. She is out of flexeril. Has not tried physical therapy in the past. Associated symptoms of neck pain include tingling and radiation of pain to L arm. Had medrol dose pack at previous appt, but was unable to finish it due to side effects of flushing, diaphoresis, and palpitations.     Regarding lower back and hip pain, pain originally was only present on R side with sciatica to R side, now pain radiating to L side with tingling in foot. No problems with B/B.     Neck Pain   This is a chronic problem. The current episode started more than 1 year ago (constant for 4-5 months). The problem occurs constantly. The problem has been gradually worsening. The pain is associated with nothing. The pain is present in the left side. The quality of the pain is described as aching and shooting. The pain is at a severity of 7/10. The pain is mild. The symptoms are aggravated by twisting and position. The pain is same all the time. Associated symptoms include headaches and tingling (L shoulder/arm). Pertinent negatives include no chest pain, fever, leg pain, numbness, pain with swallowing, photophobia, syncope, weakness or weight loss. She has tried acetaminophen, NSAIDs, muscle relaxants, home exercises, heat and ice for the symptoms. The treatment provided mild relief.       Patient Active Problem List   Diagnosis     "Depression    Obesity, Class I, BMI 30-34.9    Tobacco use    Cervical radiculopathy    Chronic bilateral low back pain with bilateral sciatica    Chronic neck pain       Family History   Problem Relation Age of Onset    Breast cancer Mother 65        Stage II S/p BL Mastectomy    No Known Problems Father      Past Surgical History:   Procedure Laterality Date    WISDOM TOOTH EXTRACTION           Current Outpatient Medications:     meloxicam (MOBIC) 15 MG tablet, Take 1 tablet (15 mg total) by mouth daily as needed for Pain., Disp: 30 tablet, Rfl: 0    tiZANidine (ZANAFLEX) 4 MG tablet, Take 1 tablet (4 mg total) by mouth every 8 (eight) hours as needed., Disp: 30 tablet, Rfl: 0    Review of Systems   Constitutional: Negative for chills, fatigue, fever and weight loss.   Eyes: Negative for photophobia and visual disturbance.   Respiratory: Negative for shortness of breath.    Cardiovascular: Positive for palpitations (with steroid). Negative for chest pain and syncope.   Gastrointestinal: Negative for abdominal pain, nausea and vomiting.   Musculoskeletal: Positive for arthralgias, back pain, myalgias and neck pain. Negative for gait problem and joint swelling.   Neurological: Positive for tingling (L shoulder/arm) and headaches. Negative for dizziness, weakness, light-headedness and numbness.       Objective:   /62   Pulse 94   Temp 98.4 °F (36.9 °C) (Temporal)   Ht 5' 4" (1.626 m)   Wt 86.7 kg (191 lb 2.2 oz)   LMP 10/15/2020   SpO2 98%   BMI 32.81 kg/m²      Physical Exam  Constitutional:       General: She is not in acute distress.     Appearance: Normal appearance. She is obese. She is not ill-appearing.   HENT:      Head: Normocephalic and atraumatic.   Neck:      Musculoskeletal: Neck supple. Muscular tenderness present.   Cardiovascular:      Rate and Rhythm: Normal rate and regular rhythm.      Pulses: Normal pulses.      Heart sounds: Normal heart sounds. No murmur. No friction rub. " No gallop.    Pulmonary:      Effort: Pulmonary effort is normal. No respiratory distress.      Breath sounds: Normal breath sounds.   Musculoskeletal:      Cervical back: She exhibits decreased range of motion and tenderness (L sided posterior). She exhibits no swelling.      Thoracic back: Normal. She exhibits normal range of motion and no tenderness.      Lumbar back: She exhibits tenderness (bilateral lower back TTP). She exhibits normal range of motion, no swelling and no spasm.      Right lower leg: No edema.      Left lower leg: No edema.      Comments: +SLT bilateral. Pain reproduced with external rotation of R hip.    Lymphadenopathy:      Cervical: No cervical adenopathy.   Skin:     General: Skin is warm and dry.      Coloration: Skin is not pale.      Findings: No erythema.   Neurological:      General: No focal deficit present.      Mental Status: She is alert and oriented to person, place, and time.   Psychiatric:         Mood and Affect: Mood normal.         Behavior: Behavior normal.         Assessment & Plan     Problem List Items Addressed This Visit        Endocrine    Obesity, Class I, BMI 30-34.9    Relevant Orders    Comprehensive Metabolic Panel    Lipid Panel    Hemoglobin A1C       Orthopedic    Chronic bilateral low back pain with bilateral sciatica    Current Assessment & Plan     NSAID  Muscle relaxer  Physical therapy, home stretches  Possible pain management referral in future.          Chronic neck pain - Primary    Current Assessment & Plan     DDD noted on previous x ray with some subluxation and spurring. Pain continues without improvement. Will try PT, and also do MRI.   Mobic sent since having problems with GI upset from ibuprofen with frequent dosing.   Rx sent for tizanidine since pt reports that she was actually having to take two of flexeril for pain relief.          Relevant Medications    meloxicam (MOBIC) 15 MG tablet    tiZANidine (ZANAFLEX) 4 MG tablet    Other Relevant  "Orders    MRI Cervical Spine Without Contrast    Ambulatory referral/consult to Physical/Occupational Therapy       Other    Tobacco use    Current Assessment & Plan     Declined smoke cessation program at this time. Counseled on importance of smoke cessation in order to improve overall quality of life and reduce risk of future comorbidities.            Other Visit Diagnoses     Radiculopathy, cervical region        Relevant Medications    meloxicam (MOBIC) 15 MG tablet    tiZANidine (ZANAFLEX) 4 MG tablet    Other Relevant Orders    MRI Cervical Spine Without Contrast    Ambulatory referral/consult to Physical/Occupational Therapy    Encounter for hepatitis C screening test for low risk patient        Relevant Orders    Hepatitis C Antibody    Screening for HIV without presence of risk factors        Relevant Orders    HIV 1/2 Ag/Ab (4th Gen)    Encounter for screening for malignant neoplasm of breast, unspecified screening modality        Relevant Orders    Mammo Digital Screening Bilat w/ [facility]           Follow up if symptoms worsen or fail to improve.          Portions of this note may have been created with voice recognition software. Occasional "wrong-word" or "sound-a-like" substitutions may have occurred due to the inherent limitations of voice recognition software. Please, read the note carefully and recognize, using context, where substitutions have occurred.       "

## 2020-11-12 NOTE — ASSESSMENT & PLAN NOTE
NSAID  Muscle relaxer  Physical therapy, home stretches  Possible pain management referral in future.

## 2020-11-12 NOTE — ASSESSMENT & PLAN NOTE
DDD noted on previous x ray with some subluxation and spurring. Pain continues without improvement. Will try PT, and also do MRI.   Mobic sent since having problems with GI upset from ibuprofen with frequent dosing.   Rx sent for tizanidine since pt reports that she was actually having to take two of flexeril for pain relief.

## 2020-11-19 ENCOUNTER — HOSPITAL ENCOUNTER (OUTPATIENT)
Dept: RADIOLOGY | Facility: HOSPITAL | Age: 50
Discharge: HOME | End: 2020-11-19
Attending: NURSE PRACTITIONER
Payer: MEDICAID

## 2020-11-19 DIAGNOSIS — M54.12 RADICULOPATHY, CERVICAL REGION: ICD-10-CM

## 2020-11-19 DIAGNOSIS — G89.29 CHRONIC NECK PAIN: ICD-10-CM

## 2020-11-19 DIAGNOSIS — M54.2 CHRONIC NECK PAIN: ICD-10-CM

## 2020-11-19 PROCEDURE — 72141 MRI CERVICAL SPINE WITHOUT CONTRAST: ICD-10-PCS | Mod: 26,,, | Performed by: RADIOLOGY

## 2020-11-19 PROCEDURE — 72141 MRI NECK SPINE W/O DYE: CPT | Mod: TC,PO

## 2020-11-19 PROCEDURE — 72141 MRI NECK SPINE W/O DYE: CPT | Mod: 26,,, | Performed by: RADIOLOGY

## 2020-11-20 ENCOUNTER — TELEPHONE (OUTPATIENT)
Dept: ORTHOPEDICS | Facility: CLINIC | Age: 50
End: 2020-11-20

## 2020-11-20 ENCOUNTER — PATIENT MESSAGE (OUTPATIENT)
Dept: INTERNAL MEDICINE | Facility: CLINIC | Age: 50
End: 2020-11-20

## 2020-11-20 DIAGNOSIS — M48.02 CERVICAL STENOSIS OF SPINE: Primary | ICD-10-CM

## 2020-11-20 DIAGNOSIS — M54.12 RADICULOPATHY, CERVICAL REGION: ICD-10-CM

## 2020-11-20 DIAGNOSIS — G89.29 CHRONIC NECK PAIN: ICD-10-CM

## 2020-11-20 DIAGNOSIS — M54.2 CHRONIC NECK PAIN: ICD-10-CM

## 2020-11-20 RX ORDER — TIZANIDINE 4 MG/1
4 TABLET ORAL EVERY 8 HOURS PRN
Qty: 30 TABLET | Refills: 0 | Status: SHIPPED | OUTPATIENT
Start: 2020-11-20 | End: 2020-11-23 | Stop reason: ALTCHOICE

## 2020-11-20 RX ORDER — TIZANIDINE 4 MG/1
4 TABLET ORAL EVERY 8 HOURS PRN
Qty: 30 TABLET | Refills: 0 | Status: CANCELLED | OUTPATIENT
Start: 2020-11-20 | End: 2020-11-30

## 2020-11-23 ENCOUNTER — OFFICE VISIT (OUTPATIENT)
Dept: NEUROSURGERY | Facility: CLINIC | Age: 50
End: 2020-11-23
Payer: MEDICAID

## 2020-11-23 VITALS
RESPIRATION RATE: 16 BRPM | WEIGHT: 192.69 LBS | BODY MASS INDEX: 32.9 KG/M2 | SYSTOLIC BLOOD PRESSURE: 130 MMHG | DIASTOLIC BLOOD PRESSURE: 81 MMHG | HEIGHT: 64 IN | HEART RATE: 80 BPM

## 2020-11-23 DIAGNOSIS — M54.12 RADICULOPATHY, CERVICAL REGION: Primary | ICD-10-CM

## 2020-11-23 DIAGNOSIS — M48.02 CERVICAL STENOSIS OF SPINE: ICD-10-CM

## 2020-11-23 PROCEDURE — 99999 PR PBB SHADOW E&M-EST. PATIENT-LVL IV: CPT | Mod: PBBFAC,,, | Performed by: NEUROLOGICAL SURGERY

## 2020-11-23 PROCEDURE — 99202 OFFICE O/P NEW SF 15 MIN: CPT | Mod: S$PBB,,, | Performed by: NEUROLOGICAL SURGERY

## 2020-11-23 PROCEDURE — 99999 PR PBB SHADOW E&M-EST. PATIENT-LVL IV: ICD-10-PCS | Mod: PBBFAC,,, | Performed by: NEUROLOGICAL SURGERY

## 2020-11-23 PROCEDURE — 99202 PR OFFICE/OUTPT VISIT, NEW, LEVL II, 15-29 MIN: ICD-10-PCS | Mod: S$PBB,,, | Performed by: NEUROLOGICAL SURGERY

## 2020-11-23 PROCEDURE — 99214 OFFICE O/P EST MOD 30 MIN: CPT | Mod: PBBFAC | Performed by: NEUROLOGICAL SURGERY

## 2020-11-23 RX ORDER — CYCLOBENZAPRINE HCL 10 MG
10 TABLET ORAL 3 TIMES DAILY PRN
Qty: 60 TABLET | Refills: 1 | Status: SHIPPED | OUTPATIENT
Start: 2020-11-23 | End: 2020-12-28 | Stop reason: SDUPTHER

## 2020-11-23 NOTE — PATIENT INSTRUCTIONS
Understanding Cervical Radiculopathy    Cervical radiculopathy is irritation or inflammation of a nerve root in the neck. It causes neck pain and other symptoms that may spread into the chest or down the arm. To understand this condition, it helps to understand the parts of the spine:  · Vertebrae. These are bones that stack to form the spine. The cervical spine contains the 7 vertebrae in the neck.  · Disks. These are soft pads of tissue between the vertebrae. They act as shock absorbers for the spine.  · The spinal canal. This is a tunnel formed within the stacked vertebrae. The spinal cord runs through this canal.  · Nerves. These branch off the spinal cord. As they leave the spinal canal, nerves pass through openings between the vertebrae. The nerve root is the part of the nerve that is closest to the spinal cord.   With cervical radiculopathy, nerve roots in the neck become irritated. This leads to pain and symptoms that can travel to the nerves that go from the spinal cord down the arms and into the torso.  What causes cervical radiculopathy?  Aging, injury, poor posture, and other issues can lead to problems in the neck. These problems may then irritate nerve roots. These include:  · Damage to a disk in the cervical spine. The damaged disk may then press on nearby nerve roots.  · Degeneration from wear and tear, and aging. This can lead to narrowing (stenosis) of the openings between the vertebrae. The narrowed openings press on nerve roots as they leave the spinal canal.  · An unstable spine. This is when a vertebra slips forward. It can then press on a nerve root.  There are other, less common causes of pressure on nerves in the neck. These include infection, cysts, and tumors.  Symptoms of cervical radiculopathy  These include:  · Neck pain  · Pain, numbness, tingling, or weakness that travels down the arm  · Loss of neck movement  · Muscle spasms  Treatment for cervical radiculopathy  In most cases,  your healthcare provider will first try treatments that help relieve symptoms. These may include:  · Prescription or over-the-counter pain medicines. These help relieve pain and swelling.  · Cold packs. These help reduce pain.  · Resting. This involves avoiding positions and activities that increase pain.  · Neck brace (cervical collar). This can help relieve inflammation and pain.  · Physical therapy, including exercises and stretches. This can help decrease pain and increase movement and function.  · Shots of medicinesaround the nerve roots. This is done to help relieve symptoms for a time.  In some cases, your healthcare provider may advise surgery to fix the underlying problem. This depends on the cause, the symptoms, and how long the pain has lasted.  Possible complications  Over time, an irritated and inflamed nerve may become damaged. This may lead to long-lasting (permanent) numbness or weakness. If symptoms change suddenly or get worse, be sure to let your healthcare provider know.     When to call your healthcare provider  Call your healthcare provider right away if you have any of these:  · New pain or pain that gets worse  · New or increasing weakness, numbness, or tingling in your arm or hand  · Bowel or bladder changes   Date Last Reviewed: 3/10/2016  © 1553-0145 [employer]. [address] [place] [address], [place] [address]. All rights reserved. This information is not intended as a substitute for professional medical care. Always follow your healthcare professional's instructions.

## 2020-11-23 NOTE — LETTER
November 23, 2020      [place]  [address] [address] [address]           [facility] Neurosurgery  [address] [facility] [address]  Phone: [phone]  Fax: [phone]          Patient: Jael Olguin   MR Number: [id number]   YOB: 1970   Date of Visit: 11/23/2020       Dear Kellen Kwong:    Thank you for referring Jael Olguin to me for evaluation. Attached you will find relevant portions of my assessment and plan of care.    If you have questions, please do not hesitate to call me. I look forward to following Jael Olguin along with you.    Sincerely,    Artem John MD    Enclosure  CC:  No Recipients    If you would like to receive this communication electronically, please contact [email or web address] or [phone] to request more information on [facility] Link access.    For providers and/or their staff who would like to refer a patient to [facility], please contact us through our one-stop-shop provider referral line, [facility], at [phone].    If you feel you have received this communication in error or would no longer like to receive these types of communications, please e-mail [email or web address]
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
## 2020-11-24 ENCOUNTER — TELEPHONE (OUTPATIENT)
Dept: NEUROSURGERY | Facility: CLINIC | Age: 50
End: 2020-11-24

## 2020-11-24 ENCOUNTER — PATIENT MESSAGE (OUTPATIENT)
Dept: INTERNAL MEDICINE | Facility: CLINIC | Age: 50
End: 2020-11-24

## 2020-11-24 ENCOUNTER — PATIENT MESSAGE (OUTPATIENT)
Dept: NEUROSURGERY | Facility: CLINIC | Age: 50
End: 2020-11-24

## 2020-11-24 NOTE — TELEPHONE ENCOUNTER
Made appt on 12/29, 3:20pm,  , on . Pt understood. All questions answered.   Alec Hernandez MA  [facility]

## 2020-11-24 NOTE — TELEPHONE ENCOUNTER
----- Message from Dilia Pappas sent at 11/24/2020  9:30 AM CST -----  .Type:  Patient Returning Call    Who Called:HOUSTON PAULINO   Who Left Message for Patient? Nurse  Does the patient know what this is regarding?:  Would the patient rather a call back or a response via [facility]? Call  Best Call Back Number:.[phone]   Additional Information:

## 2020-11-25 ENCOUNTER — PATIENT MESSAGE (OUTPATIENT)
Dept: INTERNAL MEDICINE | Facility: CLINIC | Age: 50
End: 2020-11-25

## 2020-11-25 RX ORDER — GABAPENTIN 300 MG/1
300 CAPSULE ORAL 3 TIMES DAILY
Qty: 90 CAPSULE | Refills: 11 | Status: SHIPPED | OUTPATIENT
Start: 2020-11-25 | End: 2020-12-30 | Stop reason: SDUPTHER

## 2020-12-27 ENCOUNTER — PATIENT OUTREACH (OUTPATIENT)
Dept: ADMINISTRATIVE | Facility: OTHER | Age: 50
End: 2020-12-27

## 2020-12-28 ENCOUNTER — PATIENT OUTREACH (OUTPATIENT)
Dept: ADMINISTRATIVE | Facility: OTHER | Age: 50
End: 2020-12-28

## 2020-12-28 DIAGNOSIS — M54.12 RADICULOPATHY, CERVICAL REGION: ICD-10-CM

## 2020-12-28 DIAGNOSIS — M48.02 CERVICAL STENOSIS OF SPINE: ICD-10-CM

## 2020-12-28 NOTE — PROGRESS NOTES
PATIENT IS STARTING A NEW JOB AND INSURANCE IN January.  PATIENT WILL CALL AS SOON AS INSURANCE IS IN EFFECT.

## 2020-12-28 NOTE — PROGRESS NOTES
Health Maintenance Due   Topic Date Due    Hepatitis C Screening  1970    Lipid Panel  1970    HIV Screening  07/29/1985    TETANUS VACCINE  07/29/1988    Pneumococcal Vaccine (Medium Risk) (1 of 1 - PPSV23) 07/29/1989    Cervical Cancer Screening  07/29/1991    Mammogram  07/29/2010    Shingles Vaccine (1 of 2) 07/29/2020    Colorectal Cancer Screening  07/29/2020     Updates were requested from care everywhere.  Chart was reviewed for overdue Proactive [facility] Encounters (MAEVE) topics (CRS, Breast Cancer Screening, Eye exam)  Health Maintenance has been updated.  LINKS immunization registry triggered.  Immunizations were reconciled.

## 2020-12-29 RX ORDER — CYCLOBENZAPRINE HCL 10 MG
10 TABLET ORAL 3 TIMES DAILY PRN
Qty: 60 TABLET | Refills: 0 | Status: ON HOLD | OUTPATIENT
Start: 2020-12-29 | End: 2021-01-14

## 2020-12-30 ENCOUNTER — OFFICE VISIT (OUTPATIENT)
Dept: PAIN MEDICINE | Facility: CLINIC | Age: 50
End: 2020-12-30
Payer: MEDICAID

## 2020-12-30 VITALS
HEART RATE: 93 BPM | DIASTOLIC BLOOD PRESSURE: 82 MMHG | BODY MASS INDEX: 33.57 KG/M2 | SYSTOLIC BLOOD PRESSURE: 124 MMHG | HEIGHT: 64 IN | WEIGHT: 196.63 LBS

## 2020-12-30 DIAGNOSIS — M54.12 RADICULOPATHY, CERVICAL REGION: ICD-10-CM

## 2020-12-30 DIAGNOSIS — M48.02 CERVICAL STENOSIS OF SPINE: ICD-10-CM

## 2020-12-30 PROCEDURE — 99213 OFFICE O/P EST LOW 20 MIN: CPT | Mod: PBBFAC | Performed by: PHYSICAL MEDICINE & REHABILITATION

## 2020-12-30 PROCEDURE — 99999 PR PBB SHADOW E&M-EST. PATIENT-LVL III: ICD-10-PCS | Mod: PBBFAC,,, | Performed by: PHYSICAL MEDICINE & REHABILITATION

## 2020-12-30 PROCEDURE — 99999 PR PBB SHADOW E&M-EST. PATIENT-LVL III: CPT | Mod: PBBFAC,,, | Performed by: PHYSICAL MEDICINE & REHABILITATION

## 2020-12-30 PROCEDURE — 99204 OFFICE O/P NEW MOD 45 MIN: CPT | Mod: S$PBB,,, | Performed by: PHYSICAL MEDICINE & REHABILITATION

## 2020-12-30 PROCEDURE — 99204 PR OFFICE/OUTPT VISIT, NEW, LEVL IV, 45-59 MIN: ICD-10-PCS | Mod: S$PBB,,, | Performed by: PHYSICAL MEDICINE & REHABILITATION

## 2020-12-30 RX ORDER — HYDROCODONE BITARTRATE AND ACETAMINOPHEN 5; 325 MG/1; MG/1
1 TABLET ORAL EVERY 6 HOURS PRN
Qty: 28 TABLET | Refills: 0 | Status: SHIPPED | OUTPATIENT
Start: 2020-12-30 | End: 2021-01-07 | Stop reason: SDUPTHER

## 2020-12-30 RX ORDER — GABAPENTIN 300 MG/1
CAPSULE ORAL
Qty: 120 CAPSULE | Refills: 11 | Status: SHIPPED | OUTPATIENT
Start: 2020-12-30 | End: 2022-01-06
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
## 2020-12-30 NOTE — LETTER
December 31, 2020      Artem John MD  [address]  [place] [address]           [facility]  [address] [facility] [address]  Phone: [phone]  Fax: [phone]          Patient: Jael Olguin   MR Number: [id number]   YOB: 1970   Date of Visit: 12/30/2020       Dear Dr. Artem John:    Thank you for referring Jael Olguin to me for evaluation. Attached you will find relevant portions of my assessment and plan of care.    If you have questions, please do not hesitate to call me. I look forward to following Jael Olguin along with you.    Sincerely,    Chris Rubin MD    Enclosure  CC:  No Recipients    If you would like to receive this communication electronically, please contact [email or web address] or [phone] to request more information on [employer] Link access.    For providers and/or their staff who would like to refer a patient to [facility], please contact us through our one-stop-shop provider referral line, [facility], at [phone].    If you feel you have received this communication in error or would no longer like to receive these types of communications, please e-mail [email or web address]

## 2021-01-07 ENCOUNTER — PATIENT MESSAGE (OUTPATIENT)
Dept: PAIN MEDICINE | Facility: HOSPITAL | Age: 51
End: 2021-01-07

## 2021-01-07 RX ORDER — HYDROCODONE BITARTRATE AND ACETAMINOPHEN 5; 325 MG/1; MG/1
1 TABLET ORAL EVERY 6 HOURS PRN
Qty: 28 TABLET | Refills: 0 | Status: ON HOLD | OUTPATIENT
Start: 2021-01-07 | End: 2021-01-14 | Stop reason: HOSPADM

## 2021-01-11 DIAGNOSIS — M48.02 CERVICAL STENOSIS OF SPINE: ICD-10-CM

## 2021-01-11 DIAGNOSIS — M54.12 RADICULOPATHY, CERVICAL REGION: ICD-10-CM

## 2021-01-12 DIAGNOSIS — M54.12 RADICULOPATHY, CERVICAL REGION: ICD-10-CM

## 2021-01-12 DIAGNOSIS — M48.02 CERVICAL STENOSIS OF SPINE: Primary | ICD-10-CM

## 2021-01-13 ENCOUNTER — PATIENT MESSAGE (OUTPATIENT)
Dept: NEUROSURGERY | Facility: CLINIC | Age: 51
End: 2021-01-13

## 2021-01-14 ENCOUNTER — HOSPITAL ENCOUNTER (OUTPATIENT)
Facility: HOSPITAL | Age: 51
Discharge: HOME | End: 2021-01-14
Attending: PHYSICAL MEDICINE & REHABILITATION | Admitting: PHYSICAL MEDICINE & REHABILITATION
Payer: MEDICAID

## 2021-01-14 ENCOUNTER — PATIENT MESSAGE (OUTPATIENT)
Dept: PAIN MEDICINE | Facility: HOSPITAL | Age: 51
End: 2021-01-14

## 2021-01-14 VITALS
BODY MASS INDEX: 33.2 KG/M2 | WEIGHT: 194.44 LBS | SYSTOLIC BLOOD PRESSURE: 134 MMHG | HEART RATE: 79 BPM | HEIGHT: 64 IN | OXYGEN SATURATION: 100 % | RESPIRATION RATE: 18 BRPM | TEMPERATURE: 98 F | DIASTOLIC BLOOD PRESSURE: 81 MMHG

## 2021-01-14 DIAGNOSIS — M54.12 CERVICAL RADICULOPATHY: Primary | ICD-10-CM

## 2021-01-14 LAB
B-HCG UR QL: NEGATIVE
CTP QC/QA: YES

## 2021-01-14 PROCEDURE — 62321 NJX INTERLAMINAR CRV/THRC: CPT | Mod: ,,, | Performed by: PHYSICAL MEDICINE & REHABILITATION

## 2021-01-14 PROCEDURE — 62321 NJX INTERLAMINAR CRV/THRC: CPT | Performed by: PHYSICAL MEDICINE & REHABILITATION

## 2021-01-14 PROCEDURE — 25500020 PHARM REV CODE 255: Performed by: PHYSICAL MEDICINE & REHABILITATION

## 2021-01-14 PROCEDURE — 25000003 PHARM REV CODE 250: Performed by: PHYSICAL MEDICINE & REHABILITATION

## 2021-01-14 PROCEDURE — 81025 URINE PREGNANCY TEST: CPT | Performed by: PHYSICAL MEDICINE & REHABILITATION

## 2021-01-14 PROCEDURE — 63600175 PHARM REV CODE 636 W HCPCS: Performed by: PHYSICAL MEDICINE & REHABILITATION

## 2021-01-14 PROCEDURE — 62321 PR INJ CERV/THORAC, W/GUIDANCE: ICD-10-PCS | Mod: ,,, | Performed by: PHYSICAL MEDICINE & REHABILITATION

## 2021-01-14 PROCEDURE — 99152 MOD SED SAME PHYS/QHP 5/>YRS: CPT | Performed by: PHYSICAL MEDICINE & REHABILITATION

## 2021-01-14 RX ORDER — FENTANYL CITRATE 50 UG/ML
INJECTION, SOLUTION INTRAMUSCULAR; INTRAVENOUS
Status: DISCONTINUED | OUTPATIENT
Start: 2021-01-14 | End: 2021-01-14 | Stop reason: HOSPADM

## 2021-01-14 RX ORDER — DEXAMETHASONE SODIUM PHOSPHATE 10 MG/ML
INJECTION INTRAMUSCULAR; INTRAVENOUS
Status: DISCONTINUED | OUTPATIENT
Start: 2021-01-14 | End: 2021-01-14 | Stop reason: HOSPADM

## 2021-01-14 RX ORDER — CYCLOBENZAPRINE HCL 10 MG
TABLET ORAL
Qty: 60 TABLET | Refills: 0 | Status: SHIPPED | OUTPATIENT
Start: 2021-01-14 | End: 2021-02-03 | Stop reason: SDUPTHER

## 2021-01-14 RX ORDER — CYCLOBENZAPRINE HCL 10 MG
10 TABLET ORAL 3 TIMES DAILY PRN
Qty: 60 TABLET | Refills: 0 | OUTPATIENT
Start: 2021-01-14

## 2021-01-14 RX ORDER — MIDAZOLAM HYDROCHLORIDE 1 MG/ML
INJECTION, SOLUTION INTRAMUSCULAR; INTRAVENOUS
Status: DISCONTINUED | OUTPATIENT
Start: 2021-01-14 | End: 2021-01-14 | Stop reason: HOSPADM

## 2021-01-14 RX ORDER — HYDROCODONE BITARTRATE AND ACETAMINOPHEN 5; 325 MG/1; MG/1
1 TABLET ORAL EVERY 6 HOURS PRN
Qty: 28 TABLET | Refills: 0 | OUTPATIENT
Start: 2021-01-14 | End: 2021-01-21

## 2021-01-14 RX ORDER — ONDANSETRON 2 MG/ML
4 INJECTION INTRAMUSCULAR; INTRAVENOUS ONCE AS NEEDED
Status: DISCONTINUED | OUTPATIENT
Start: 2021-01-14 | End: 2021-01-14 | Stop reason: HOSPADM

## 2021-01-14 RX ORDER — BUPIVACAINE HYDROCHLORIDE 2.5 MG/ML
INJECTION, SOLUTION EPIDURAL; INFILTRATION; INTRACAUDAL
Status: DISCONTINUED | OUTPATIENT
Start: 2021-01-14 | End: 2021-01-14 | Stop reason: HOSPADM

## 2021-02-03 ENCOUNTER — PATIENT MESSAGE (OUTPATIENT)
Dept: NEUROSURGERY | Facility: CLINIC | Age: 51
End: 2021-02-03

## 2021-02-03 ENCOUNTER — PATIENT OUTREACH (OUTPATIENT)
Dept: ADMINISTRATIVE | Facility: OTHER | Age: 51
End: 2021-02-03

## 2021-02-03 DIAGNOSIS — M48.02 CERVICAL STENOSIS OF SPINE: ICD-10-CM

## 2021-02-03 DIAGNOSIS — M54.12 RADICULOPATHY, CERVICAL REGION: ICD-10-CM

## 2021-02-03 RX ORDER — CYCLOBENZAPRINE HCL 10 MG
10 TABLET ORAL 3 TIMES DAILY PRN
Qty: 60 TABLET | Refills: 0 | Status: SHIPPED | OUTPATIENT
Start: 2021-02-03 | End: 2021-02-19 | Stop reason: SDUPTHER

## 2021-02-12 ENCOUNTER — PATIENT MESSAGE (OUTPATIENT)
Dept: PAIN MEDICINE | Facility: CLINIC | Age: 51
End: 2021-02-12

## 2021-02-17 ENCOUNTER — PATIENT OUTREACH (OUTPATIENT)
Dept: ADMINISTRATIVE | Facility: HOSPITAL | Age: 51
End: 2021-02-17

## 2021-02-24 ENCOUNTER — OFFICE VISIT (OUTPATIENT)
Dept: INTERNAL MEDICINE | Facility: CLINIC | Age: 51
End: 2021-02-24
Payer: MEDICAID

## 2021-02-24 DIAGNOSIS — R05.9 COUGH: Primary | ICD-10-CM

## 2021-02-24 DIAGNOSIS — R06.2 WHEEZING: ICD-10-CM

## 2021-02-24 PROCEDURE — U0005 INFEC AGEN DETEC AMPLI PROBE: HCPCS

## 2021-02-24 PROCEDURE — U0003 INFECTIOUS AGENT DETECTION BY NUCLEIC ACID (DNA OR RNA); SEVERE ACUTE RESPIRATORY SYNDROME CORONAVIRUS 2 (SARS-COV-2) (CORONAVIRUS DISEASE [COVID-19]), AMPLIFIED PROBE TECHNIQUE, MAKING USE OF HIGH THROUGHPUT TECHNOLOGIES AS DESCRIBED BY CMS-2020-01-R: HCPCS

## 2021-02-24 PROCEDURE — 99213 PR OFFICE/OUTPT VISIT, EST, LEVL III, 20-29 MIN: ICD-10-PCS | Mod: 95,,, | Performed by: NURSE PRACTITIONER

## 2021-02-24 PROCEDURE — 99213 OFFICE O/P EST LOW 20 MIN: CPT | Mod: 95,,, | Performed by: NURSE PRACTITIONER

## 2021-02-24 RX ORDER — BENZONATATE 100 MG/1
100 CAPSULE ORAL 3 TIMES DAILY PRN
Qty: 30 CAPSULE | Refills: 0 | Status: SHIPPED | OUTPATIENT
Start: 2021-02-24 | End: 2021-03-06

## 2021-02-24 RX ORDER — ALBUTEROL SULFATE 90 UG/1
2 AEROSOL, METERED RESPIRATORY (INHALATION) EVERY 6 HOURS PRN
Qty: 18 G | Refills: 0 | Status: SHIPPED | OUTPATIENT
Start: 2021-02-24 | End: 2022-03-30

## 2021-02-24 RX ORDER — PROMETHAZINE HYDROCHLORIDE AND DEXTROMETHORPHAN HYDROBROMIDE 6.25; 15 MG/5ML; MG/5ML
5 SYRUP ORAL EVERY 4 HOURS PRN
Qty: 180 ML | Refills: 0 | Status: SHIPPED | OUTPATIENT
Start: 2021-02-24 | End: 2021-03-06

## 2021-02-25 ENCOUNTER — CLINICAL SUPPORT (OUTPATIENT)
Dept: INTERNAL MEDICINE | Facility: CLINIC | Age: 51
End: 2021-02-25
Payer: MEDICAID

## 2021-02-25 DIAGNOSIS — J01.90 ACUTE NON-RECURRENT SINUSITIS, UNSPECIFIED LOCATION: Primary | ICD-10-CM

## 2021-02-26 LAB — SARS-COV-2 RNA RESP QL NAA+PROBE: NOT DETECTED

## 2021-04-28 ENCOUNTER — PATIENT MESSAGE (OUTPATIENT)
Dept: RESEARCH | Facility: HOSPITAL | Age: 51
End: 2021-04-28

## 2021-05-05 ENCOUNTER — LAB VISIT (OUTPATIENT)
Dept: LAB | Facility: HOSPITAL | Age: 51
End: 2021-05-05
Attending: ORTHOPAEDIC SURGERY
Payer: COMMERCIAL

## 2021-05-05 DIAGNOSIS — Z01.812 PRE-OPERATIVE LABORATORY EXAMINATION: ICD-10-CM

## 2021-05-05 DIAGNOSIS — Z79.01 LONG TERM (CURRENT) USE OF ANTICOAGULANTS: ICD-10-CM

## 2021-05-05 LAB
APTT BLDCRRT: 28 SEC (ref 21–32)
ERYTHROCYTE [DISTWIDTH] IN BLOOD BY AUTOMATED COUNT: 13.4 % (ref 11.5–14.5)
HCT VFR BLD AUTO: 41.3 % (ref 37–48.5)
HGB BLD-MCNC: 13.6 G/DL (ref 12–16)
INR PPP: 0.9 (ref 0.8–1.2)
MCH RBC QN AUTO: 29.1 PG (ref 27–31)
MCHC RBC AUTO-ENTMCNC: 32.9 G/DL (ref 32–36)
MCV RBC AUTO: 88 FL (ref 82–98)
PLATELET # BLD AUTO: 461 K/UL (ref 150–450)
PMV BLD AUTO: 8.8 FL (ref 9.2–12.9)
PROTHROMBIN TIME: 9.9 SEC (ref 9–12.5)
RBC # BLD AUTO: 4.67 M/UL (ref 4–5.4)
WBC # BLD AUTO: 9.72 K/UL (ref 3.9–12.7)

## 2021-05-05 PROCEDURE — 85027 COMPLETE CBC AUTOMATED: CPT | Mod: PO | Performed by: ORTHOPAEDIC SURGERY

## 2021-05-05 PROCEDURE — 85610 PROTHROMBIN TIME: CPT | Mod: PO | Performed by: ORTHOPAEDIC SURGERY

## 2021-05-05 PROCEDURE — 85730 THROMBOPLASTIN TIME PARTIAL: CPT | Mod: PO | Performed by: ORTHOPAEDIC SURGERY

## 2021-05-05 PROCEDURE — 36415 COLL VENOUS BLD VENIPUNCTURE: CPT | Mod: PO | Performed by: ORTHOPAEDIC SURGERY

## 2021-06-17 ENCOUNTER — TELEPHONE (OUTPATIENT)
Dept: ADMINISTRATIVE | Facility: HOSPITAL | Age: 51
End: 2021-06-17

## 2022-03-24 ENCOUNTER — PATIENT MESSAGE (OUTPATIENT)
Dept: INTERNAL MEDICINE | Facility: CLINIC | Age: 52
End: 2022-03-24
Payer: COMMERCIAL

## 2022-03-30 ENCOUNTER — OFFICE VISIT (OUTPATIENT)
Dept: INTERNAL MEDICINE | Facility: CLINIC | Age: 52
End: 2022-03-30
Payer: COMMERCIAL

## 2022-03-30 VITALS
HEART RATE: 100 BPM | RESPIRATION RATE: 18 BRPM | HEIGHT: 64 IN | WEIGHT: 198.88 LBS | BODY MASS INDEX: 33.95 KG/M2 | OXYGEN SATURATION: 96 % | DIASTOLIC BLOOD PRESSURE: 76 MMHG | TEMPERATURE: 97 F | SYSTOLIC BLOOD PRESSURE: 132 MMHG

## 2022-03-30 DIAGNOSIS — M54.41 CHRONIC BILATERAL LOW BACK PAIN WITH BILATERAL SCIATICA: Primary | ICD-10-CM

## 2022-03-30 DIAGNOSIS — G89.29 CHRONIC BILATERAL LOW BACK PAIN WITH BILATERAL SCIATICA: Primary | ICD-10-CM

## 2022-03-30 DIAGNOSIS — R19.00 PELVIC MASS IN FEMALE: ICD-10-CM

## 2022-03-30 DIAGNOSIS — E66.9 OBESITY, CLASS I, BMI 30-34.9: ICD-10-CM

## 2022-03-30 DIAGNOSIS — M54.12 CERVICAL RADICULOPATHY: ICD-10-CM

## 2022-03-30 DIAGNOSIS — M54.42 CHRONIC BILATERAL LOW BACK PAIN WITH BILATERAL SCIATICA: Primary | ICD-10-CM

## 2022-03-30 DIAGNOSIS — M48.02 CERVICAL STENOSIS OF SPINE: ICD-10-CM

## 2022-03-30 DIAGNOSIS — Z72.0 TOBACCO USE: ICD-10-CM

## 2022-03-30 DIAGNOSIS — M54.12 RADICULOPATHY, CERVICAL REGION: ICD-10-CM

## 2022-03-30 PROCEDURE — 3008F PR BODY MASS INDEX (BMI) DOCUMENTED: ICD-10-PCS | Mod: CPTII,S$GLB,ICN, | Performed by: NURSE PRACTITIONER

## 2022-03-30 PROCEDURE — 1159F PR MEDICATION LIST DOCUMENTED IN MEDICAL RECORD: ICD-10-PCS | Mod: CPTII,S$GLB,ICN, | Performed by: NURSE PRACTITIONER

## 2022-03-30 PROCEDURE — 99214 PR OFFICE/OUTPT VISIT, EST, LEVL IV, 30-39 MIN: ICD-10-PCS | Mod: 25,S$GLB,ICN, | Performed by: NURSE PRACTITIONER

## 2022-03-30 PROCEDURE — 3075F PR MOST RECENT SYSTOLIC BLOOD PRESS GE 130-139MM HG: ICD-10-PCS | Mod: CPTII,S$GLB,ICN, | Performed by: NURSE PRACTITIONER

## 2022-03-30 PROCEDURE — 3075F SYST BP GE 130 - 139MM HG: CPT | Mod: CPTII,S$GLB,ICN, | Performed by: NURSE PRACTITIONER

## 2022-03-30 PROCEDURE — 99999 PR PBB SHADOW E&M-EST. PATIENT-LVL V: ICD-10-PCS | Mod: PBBFAC,,, | Performed by: NURSE PRACTITIONER

## 2022-03-30 PROCEDURE — 99214 OFFICE O/P EST MOD 30 MIN: CPT | Mod: 25,S$GLB,ICN, | Performed by: NURSE PRACTITIONER

## 2022-03-30 PROCEDURE — 96372 THER/PROPH/DIAG INJ SC/IM: CPT | Mod: S$GLB,ICN,, | Performed by: NURSE PRACTITIONER

## 2022-03-30 PROCEDURE — 3078F DIAST BP <80 MM HG: CPT | Mod: CPTII,S$GLB,ICN, | Performed by: NURSE PRACTITIONER

## 2022-03-30 PROCEDURE — 1159F MED LIST DOCD IN RCRD: CPT | Mod: CPTII,S$GLB,ICN, | Performed by: NURSE PRACTITIONER

## 2022-03-30 PROCEDURE — 3008F BODY MASS INDEX DOCD: CPT | Mod: CPTII,S$GLB,ICN, | Performed by: NURSE PRACTITIONER

## 2022-03-30 PROCEDURE — 3078F PR MOST RECENT DIASTOLIC BLOOD PRESSURE < 80 MM HG: ICD-10-PCS | Mod: CPTII,S$GLB,ICN, | Performed by: NURSE PRACTITIONER

## 2022-03-30 PROCEDURE — 96372 PR INJECTION,THERAP/PROPH/DIAG2ST, IM OR SUBCUT: ICD-10-PCS | Mod: S$GLB,ICN,, | Performed by: NURSE PRACTITIONER

## 2022-03-30 PROCEDURE — 99999 PR PBB SHADOW E&M-EST. PATIENT-LVL V: CPT | Mod: PBBFAC,,, | Performed by: NURSE PRACTITIONER

## 2022-03-30 RX ORDER — ONDANSETRON 4 MG/1
4 TABLET, ORALLY DISINTEGRATING ORAL EVERY 6 HOURS PRN
COMMUNITY
Start: 2022-03-11

## 2022-03-30 RX ORDER — TRAMADOL HYDROCHLORIDE 50 MG/1
TABLET ORAL
COMMUNITY
Start: 2022-03-04 | End: 2022-03-31 | Stop reason: SDUPTHER

## 2022-03-30 RX ORDER — CYCLOBENZAPRINE HCL 10 MG
10 TABLET ORAL 3 TIMES DAILY PRN
Qty: 60 TABLET | Refills: 6 | Status: SHIPPED | OUTPATIENT
Start: 2022-03-30 | End: 2022-10-06 | Stop reason: SDUPTHER

## 2022-03-30 RX ORDER — KETOROLAC TROMETHAMINE 30 MG/ML
30 INJECTION, SOLUTION INTRAMUSCULAR; INTRAVENOUS
Status: COMPLETED | OUTPATIENT
Start: 2022-03-30 | End: 2022-03-30

## 2022-03-30 RX ADMIN — KETOROLAC TROMETHAMINE 30 MG: 30 INJECTION, SOLUTION INTRAMUSCULAR; INTRAVENOUS at 03:03

## 2022-03-30 NOTE — PROGRESS NOTES
Subjective:       Patient ID: Jael Olguin is a 51 y.o. female.    Chief Complaint: Back Pain (Needs referral to pain management )    Mrs. Olguin presents to visit for referral to a pain management doctor.  She was previously being seen by Dr. Stout, but would like to seek care elsewhere due to him not refilling her pain medication and poor bedside manner.  She did bring in records for a lumbar MRI.  MRI reviewed and it was also noted that she had a large pelvic mass which most likely represents a fibroid.  Pelvic ultrasound recommended, but this has not been reported yet.  Patient reports that this was not discussed with her previously.  See images impressions below.    Back Pain  This is a chronic problem. The current episode started more than 1 year ago. The pain is present in the lumbar spine. The quality of the pain is described as aching. The pain is at a severity of 6/10. The pain is moderate. The symptoms are aggravated by position, standing, sitting and twisting. Pertinent negatives include no abdominal pain, bladder incontinence, chest pain, dysuria, fever or headaches. Risk factors include obesity. She has tried muscle relaxant, NSAIDs and analgesics for the symptoms. The treatment provided moderate relief.               Patient Active Problem List   Diagnosis    Depression    Obesity, Class I, BMI 30-34.9    Tobacco use    Cervical radiculopathy    Chronic bilateral low back pain with bilateral sciatica    Chronic neck pain       Family History   Problem Relation Age of Onset    Breast cancer Mother 65        Stage II S/p BL Mastectomy    No Known Problems Father      Past Surgical History:   Procedure Laterality Date    EPIDURAL STEROID INJECTION INTO CERVICAL SPINE N/A 1/14/2021    Procedure: C6/7 IL MARTY;  Surgeon: Chris Rubin MD;  Location: [facility];  Service: Pain Management;  Laterality: N/A;    WISDOM TOOTH EXTRACTION           Current Outpatient  "Medications:     gabapentin (NEURONTIN) 300 MG capsule, TAKE 1 CAPSULE BY MOUTH DAILY 1 WITH LUNCH AND TWO EVERY EVENING, Disp: 120 capsule, Rfl: 11    ondansetron (ZOFRAN-ODT) 4 MG TbDL, Take 4 mg by mouth every 6 (six) hours as needed., Disp: , Rfl:     cyclobenzaprine (FLEXERIL) 10 MG tablet, Take 1 tablet (10 mg total) by mouth 3 (three) times daily as needed., Disp: 60 tablet, Rfl: 6    traMADoL (ULTRAM) 50 mg tablet, TAKE 1 TABLET BY MOUTH EVERY 4-6 HOURS, Disp: 168 tablet, Rfl: 0    Review of Systems   Constitutional: Negative for chills, fatigue and fever.   Respiratory: Negative for shortness of breath and wheezing.    Cardiovascular: Negative for chest pain and palpitations.   Gastrointestinal: Negative for abdominal pain, diarrhea, nausea and vomiting.   Genitourinary: Negative for bladder incontinence, dysuria, flank pain, frequency, hematuria and urgency.   Musculoskeletal: Positive for back pain and neck pain.   Neurological: Negative for dizziness, light-headedness and headaches.       Objective:   /76 (BP Location: Left arm, Patient Position: Sitting, BP Method: Medium (Manual))   Pulse 100   Temp 96.6 °F (35.9 °C)   Resp 18   Ht 5' 4" (1.626 m)   Wt 90.2 kg (198 lb 13.7 oz)   LMP 03/11/2022   SpO2 96%   BMI 34.13 kg/m²      Physical Exam  Constitutional:       General: She is not in acute distress.     Appearance: Normal appearance. She is obese. She is not ill-appearing.   Cardiovascular:      Rate and Rhythm: Normal rate and regular rhythm.      Pulses: Normal pulses.      Heart sounds: Normal heart sounds. No murmur heard.    No friction rub. No gallop.   Pulmonary:      Effort: Pulmonary effort is normal. No respiratory distress.      Breath sounds: Normal breath sounds. No wheezing.   Abdominal:      Palpations: Abdomen is soft.      Tenderness: There is no abdominal tenderness. There is no guarding.   Musculoskeletal:      Cervical back: Decreased range of motion.      Lumbar " "back: Decreased range of motion.   Skin:     General: Skin is warm and dry.      Coloration: Skin is not pale.      Findings: No erythema.   Neurological:      Mental Status: She is alert and oriented to person, place, and time.         Assessment & Plan     Problem List Items Addressed This Visit        Neuro    Cervical radiculopathy    Current Assessment & Plan     Toradol injection today. Discussed that I am unable to refill tramadol for chronic pain. Referral sent for new pain management provider.               Endocrine    Obesity, Class I, BMI 30-34.9    Current Assessment & Plan     Counseled on importance of diet and exercise in order to improve overall quality of life, and reduce risk of future comorbidities.                Orthopedic    Chronic bilateral low back pain with bilateral sciatica - Primary    Current Assessment & Plan     Toradol injection today. Discussed that I am unable to refill tramadol for chronic pain. Referral sent for new pain management provider.            Relevant Orders    Ambulatory referral/consult to Pain Clinic       Other    Tobacco use    Current Assessment & Plan     Counseled on the importance of smoke cessation in order to improve overall quality of life and reduce risk of future comorbidities. Declined smoke cessation program.                Other Visit Diagnoses     Pelvic mass in female        Relevant Orders    US Pelvis Limited Non OB    Cervical stenosis of spine        Relevant Medications    cyclobenzaprine (FLEXERIL) 10 MG tablet    Radiculopathy, cervical region        Relevant Medications    cyclobenzaprine (FLEXERIL) 10 MG tablet           Follow up if symptoms worsen or fail to improve, and with MD for chronic pain medication refill..          Portions of this note may have been created with voice recognition software. Occasional "wrong-word" or "sound-a-like" substitutions may have occurred due to the inherent limitations of voice recognition software. " Please, read the note carefully and recognize, using context, where substitutions have occurred.

## 2022-03-31 ENCOUNTER — PATIENT MESSAGE (OUTPATIENT)
Dept: FAMILY MEDICINE | Facility: CLINIC | Age: 52
End: 2022-03-31
Payer: COMMERCIAL

## 2022-03-31 RX ORDER — TRAMADOL HYDROCHLORIDE 50 MG/1
TABLET ORAL
Qty: 42 TABLET | Refills: 0 | Status: SHIPPED | OUTPATIENT
Start: 2022-03-31 | End: 2022-04-05 | Stop reason: SDUPTHER

## 2022-04-05 ENCOUNTER — OFFICE VISIT (OUTPATIENT)
Dept: INTERNAL MEDICINE | Facility: CLINIC | Age: 52
End: 2022-04-05
Payer: COMMERCIAL

## 2022-04-05 ENCOUNTER — TELEPHONE (OUTPATIENT)
Dept: INTERNAL MEDICINE | Facility: CLINIC | Age: 52
End: 2022-04-05

## 2022-04-05 ENCOUNTER — PATIENT MESSAGE (OUTPATIENT)
Dept: INTERNAL MEDICINE | Facility: CLINIC | Age: 52
End: 2022-04-05

## 2022-04-05 DIAGNOSIS — M54.42 CHRONIC BILATERAL LOW BACK PAIN WITH BILATERAL SCIATICA: ICD-10-CM

## 2022-04-05 DIAGNOSIS — M54.41 CHRONIC BILATERAL LOW BACK PAIN WITH BILATERAL SCIATICA: ICD-10-CM

## 2022-04-05 DIAGNOSIS — Z12.31 ENCOUNTER FOR SCREENING MAMMOGRAM FOR MALIGNANT NEOPLASM OF BREAST: ICD-10-CM

## 2022-04-05 DIAGNOSIS — M54.2 CHRONIC NECK PAIN: Primary | ICD-10-CM

## 2022-04-05 DIAGNOSIS — G89.29 CHRONIC BILATERAL LOW BACK PAIN WITH BILATERAL SCIATICA: ICD-10-CM

## 2022-04-05 DIAGNOSIS — G89.29 CHRONIC NECK PAIN: Primary | ICD-10-CM

## 2022-04-05 PROBLEM — R05.9 COUGH: Status: RESOLVED | Noted: 2020-05-04 | Resolved: 2022-04-05

## 2022-04-05 PROCEDURE — 99213 PR OFFICE/OUTPT VISIT, EST, LEVL III, 20-29 MIN: ICD-10-PCS | Mod: 95,,, | Performed by: FAMILY MEDICINE

## 2022-04-05 PROCEDURE — 99213 OFFICE O/P EST LOW 20 MIN: CPT | Mod: 95,,, | Performed by: FAMILY MEDICINE

## 2022-04-05 RX ORDER — TRAMADOL HYDROCHLORIDE 50 MG/1
TABLET ORAL
Qty: 168 TABLET | Refills: 0 | Status: SHIPPED | OUTPATIENT
Start: 2022-04-05 | End: 2022-05-02 | Stop reason: SDUPTHER

## 2022-04-05 NOTE — TELEPHONE ENCOUNTER
----- Message from Shukri Mackenzie MD sent at 4/5/2022  1:25 PM CDT -----  Needs mammo on a tuesday
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
## 2022-04-07 NOTE — ASSESSMENT & PLAN NOTE
Counseled on the importance of smoke cessation in order to improve overall quality of life and reduce risk of future comorbidities. Declined smoke cessation program.

## 2022-04-07 NOTE — ASSESSMENT & PLAN NOTE
Toradol injection today. Discussed that I am unable to refill tramadol for chronic pain. Referral sent for new pain management provider.

## 2022-04-26 ENCOUNTER — PATIENT MESSAGE (OUTPATIENT)
Dept: ADMINISTRATIVE | Facility: HOSPITAL | Age: 52
End: 2022-04-26
Payer: COMMERCIAL

## 2022-05-02 ENCOUNTER — PATIENT MESSAGE (OUTPATIENT)
Dept: INTERNAL MEDICINE | Facility: CLINIC | Age: 52
End: 2022-05-02
Payer: COMMERCIAL

## 2022-05-02 RX ORDER — TRAMADOL HYDROCHLORIDE 50 MG/1
TABLET ORAL
Qty: 168 TABLET | Refills: 0 | Status: SHIPPED | OUTPATIENT
Start: 2022-05-02 | End: 2023-04-04 | Stop reason: ALTCHOICE

## 2022-05-30 ENCOUNTER — PATIENT OUTREACH (OUTPATIENT)
Dept: ADMINISTRATIVE | Facility: HOSPITAL | Age: 52
End: 2022-05-30
Payer: COMMERCIAL

## 2022-05-30 NOTE — PROGRESS NOTES
BR CERVICAL REPORT: Called and spoke with pt offered to schedule pap smear. Pt will callback to schedule was not a good time.

## 2022-06-16 ENCOUNTER — PATIENT OUTREACH (OUTPATIENT)
Dept: ADMINISTRATIVE | Facility: HOSPITAL | Age: 52
End: 2022-06-16
Payer: COMMERCIAL

## 2022-08-09 ENCOUNTER — PATIENT MESSAGE (OUTPATIENT)
Dept: ADMINISTRATIVE | Facility: HOSPITAL | Age: 52
End: 2022-08-09
Payer: COMMERCIAL

## 2022-10-04 ENCOUNTER — PATIENT MESSAGE (OUTPATIENT)
Dept: ADMINISTRATIVE | Facility: HOSPITAL | Age: 52
End: 2022-10-04
Payer: COMMERCIAL

## 2022-10-06 ENCOUNTER — PATIENT MESSAGE (OUTPATIENT)
Dept: INTERNAL MEDICINE | Facility: CLINIC | Age: 52
End: 2022-10-06
Payer: COMMERCIAL

## 2022-10-13 ENCOUNTER — PATIENT MESSAGE (OUTPATIENT)
Dept: INTERNAL MEDICINE | Facility: CLINIC | Age: 52
End: 2022-10-13
Payer: COMMERCIAL

## 2023-01-25 ENCOUNTER — PATIENT MESSAGE (OUTPATIENT)
Dept: ADMINISTRATIVE | Facility: HOSPITAL | Age: 53
End: 2023-01-25
Payer: COMMERCIAL

## 2023-02-16 ENCOUNTER — PATIENT OUTREACH (OUTPATIENT)
Dept: ADMINISTRATIVE | Facility: HOSPITAL | Age: 53
End: 2023-02-16
Payer: COMMERCIAL

## 2023-02-16 NOTE — PROGRESS NOTES
Working mammogram report; I called pt to schedule overdue mammogram, pt states she will call back at a later time.

## 2023-04-04 ENCOUNTER — OFFICE VISIT (OUTPATIENT)
Dept: INTERNAL MEDICINE | Facility: CLINIC | Age: 53
End: 2023-04-04
Payer: COMMERCIAL

## 2023-04-04 ENCOUNTER — LAB VISIT (OUTPATIENT)
Dept: LAB | Facility: HOSPITAL | Age: 53
End: 2023-04-04
Attending: NURSE PRACTITIONER
Payer: COMMERCIAL

## 2023-04-04 VITALS
OXYGEN SATURATION: 97 % | TEMPERATURE: 99 F | DIASTOLIC BLOOD PRESSURE: 80 MMHG | WEIGHT: 184.5 LBS | HEIGHT: 64 IN | SYSTOLIC BLOOD PRESSURE: 130 MMHG | BODY MASS INDEX: 31.5 KG/M2 | HEART RATE: 89 BPM

## 2023-04-04 DIAGNOSIS — M79.641 BILATERAL HAND PAIN: ICD-10-CM

## 2023-04-04 DIAGNOSIS — M79.642 BILATERAL HAND PAIN: ICD-10-CM

## 2023-04-04 DIAGNOSIS — M79.89 BILATERAL HAND SWELLING: ICD-10-CM

## 2023-04-04 DIAGNOSIS — F11.20 UNCOMPLICATED OPIOID DEPENDENCE: ICD-10-CM

## 2023-04-04 DIAGNOSIS — M79.89 BILATERAL HAND SWELLING: Primary | ICD-10-CM

## 2023-04-04 DIAGNOSIS — M54.12 CERVICAL RADICULOPATHY: ICD-10-CM

## 2023-04-04 DIAGNOSIS — Z12.31 BREAST CANCER SCREENING BY MAMMOGRAM: ICD-10-CM

## 2023-04-04 DIAGNOSIS — E66.9 OBESITY, CLASS I, BMI 30-34.9: ICD-10-CM

## 2023-04-04 DIAGNOSIS — Z12.11 COLON CANCER SCREENING: ICD-10-CM

## 2023-04-04 DIAGNOSIS — Z72.0 TOBACCO USE: ICD-10-CM

## 2023-04-04 DIAGNOSIS — M47.812 CERVICAL SPONDYLOSIS WITHOUT MYELOPATHY: ICD-10-CM

## 2023-04-04 PROBLEM — G56.03 BILATERAL CARPAL TUNNEL SYNDROME: Status: ACTIVE | Noted: 2022-05-17

## 2023-04-04 PROBLEM — Z79.891 LONG-TERM CURRENT USE OF OPIATE ANALGESIC: Status: ACTIVE | Noted: 2022-11-22

## 2023-04-04 PROBLEM — M54.17 LUMBOSACRAL RADICULOPATHY: Status: ACTIVE | Noted: 2022-05-17

## 2023-04-04 LAB
ALBUMIN SERPL BCP-MCNC: 3.7 G/DL (ref 3.5–5.2)
ALP SERPL-CCNC: 57 U/L (ref 55–135)
ALT SERPL W/O P-5'-P-CCNC: 18 U/L (ref 10–44)
ANION GAP SERPL CALC-SCNC: 8 MMOL/L (ref 8–16)
AST SERPL-CCNC: 14 U/L (ref 10–40)
BILIRUB SERPL-MCNC: 0.3 MG/DL (ref 0.1–1)
BUN SERPL-MCNC: 10 MG/DL (ref 6–20)
CALCIUM SERPL-MCNC: 8.8 MG/DL (ref 8.7–10.5)
CHLORIDE SERPL-SCNC: 109 MMOL/L (ref 95–110)
CO2 SERPL-SCNC: 24 MMOL/L (ref 23–29)
CREAT SERPL-MCNC: 0.7 MG/DL (ref 0.5–1.4)
CRP SERPL-MCNC: 2.3 MG/L (ref 0–8.2)
EST. GFR  (NO RACE VARIABLE): >60 ML/MIN/1.73 M^2
GLUCOSE SERPL-MCNC: 87 MG/DL (ref 70–110)
POTASSIUM SERPL-SCNC: 3.6 MMOL/L (ref 3.5–5.1)
PROT SERPL-MCNC: 7.3 G/DL (ref 6–8.4)
SODIUM SERPL-SCNC: 141 MMOL/L (ref 136–145)

## 2023-04-04 PROCEDURE — 1159F MED LIST DOCD IN RCRD: CPT | Mod: CPTII,S$GLB,, | Performed by: NURSE PRACTITIONER

## 2023-04-04 PROCEDURE — 86140 C-REACTIVE PROTEIN: CPT | Mod: PO | Performed by: NURSE PRACTITIONER

## 2023-04-04 PROCEDURE — 86431 RHEUMATOID FACTOR QUANT: CPT | Performed by: NURSE PRACTITIONER

## 2023-04-04 PROCEDURE — 99999 PR PBB SHADOW E&M-EST. PATIENT-LVL V: CPT | Mod: PBBFAC,,, | Performed by: NURSE PRACTITIONER

## 2023-04-04 PROCEDURE — 99214 PR OFFICE/OUTPT VISIT, EST, LEVL IV, 30-39 MIN: ICD-10-PCS | Mod: S$GLB,,, | Performed by: NURSE PRACTITIONER

## 2023-04-04 PROCEDURE — 85652 RBC SED RATE AUTOMATED: CPT | Performed by: NURSE PRACTITIONER

## 2023-04-04 PROCEDURE — 3075F SYST BP GE 130 - 139MM HG: CPT | Mod: CPTII,S$GLB,, | Performed by: NURSE PRACTITIONER

## 2023-04-04 PROCEDURE — 36415 COLL VENOUS BLD VENIPUNCTURE: CPT | Mod: PO | Performed by: NURSE PRACTITIONER

## 2023-04-04 PROCEDURE — 3079F PR MOST RECENT DIASTOLIC BLOOD PRESSURE 80-89 MM HG: ICD-10-PCS | Mod: CPTII,S$GLB,, | Performed by: NURSE PRACTITIONER

## 2023-04-04 PROCEDURE — 80053 COMPREHEN METABOLIC PANEL: CPT | Mod: PO | Performed by: NURSE PRACTITIONER

## 2023-04-04 PROCEDURE — 99999 PR PBB SHADOW E&M-EST. PATIENT-LVL V: ICD-10-PCS | Mod: PBBFAC,,, | Performed by: NURSE PRACTITIONER

## 2023-04-04 PROCEDURE — 86235 NUCLEAR ANTIGEN ANTIBODY: CPT | Mod: 59 | Performed by: NURSE PRACTITIONER

## 2023-04-04 PROCEDURE — 3008F BODY MASS INDEX DOCD: CPT | Mod: CPTII,S$GLB,, | Performed by: NURSE PRACTITIONER

## 2023-04-04 PROCEDURE — 1160F PR REVIEW ALL MEDS BY PRESCRIBER/CLIN PHARMACIST DOCUMENTED: ICD-10-PCS | Mod: CPTII,S$GLB,, | Performed by: NURSE PRACTITIONER

## 2023-04-04 PROCEDURE — 86039 ANTINUCLEAR ANTIBODIES (ANA): CPT | Performed by: NURSE PRACTITIONER

## 2023-04-04 PROCEDURE — 3079F DIAST BP 80-89 MM HG: CPT | Mod: CPTII,S$GLB,, | Performed by: NURSE PRACTITIONER

## 2023-04-04 PROCEDURE — 3008F PR BODY MASS INDEX (BMI) DOCUMENTED: ICD-10-PCS | Mod: CPTII,S$GLB,, | Performed by: NURSE PRACTITIONER

## 2023-04-04 PROCEDURE — 86038 ANTINUCLEAR ANTIBODIES: CPT | Performed by: NURSE PRACTITIONER

## 2023-04-04 PROCEDURE — 1160F RVW MEDS BY RX/DR IN RCRD: CPT | Mod: CPTII,S$GLB,, | Performed by: NURSE PRACTITIONER

## 2023-04-04 PROCEDURE — 99214 OFFICE O/P EST MOD 30 MIN: CPT | Mod: S$GLB,,, | Performed by: NURSE PRACTITIONER

## 2023-04-04 PROCEDURE — 1159F PR MEDICATION LIST DOCUMENTED IN MEDICAL RECORD: ICD-10-PCS | Mod: CPTII,S$GLB,, | Performed by: NURSE PRACTITIONER

## 2023-04-04 PROCEDURE — 3075F PR MOST RECENT SYSTOLIC BLOOD PRESS GE 130-139MM HG: ICD-10-PCS | Mod: CPTII,S$GLB,, | Performed by: NURSE PRACTITIONER

## 2023-04-04 RX ORDER — HYDROCODONE BITARTRATE AND ACETAMINOPHEN 7.5; 325 MG/1; MG/1
1 TABLET ORAL 3 TIMES DAILY
COMMUNITY
Start: 2022-11-01

## 2023-04-04 RX ORDER — MELOXICAM 15 MG/1
15 TABLET ORAL DAILY PRN
Qty: 30 TABLET | Refills: 5 | Status: SHIPPED | OUTPATIENT
Start: 2023-04-04 | End: 2023-09-29

## 2023-04-04 NOTE — PROGRESS NOTES
Subjective:       Patient ID: Jael Olguin is a 52 y.o. female.    Chief Complaint: Hand Pain (Swollen with tingling and numbness for about 6 to 8 weeks. )    Mrs. Olguin presents o visit for complaint of bilateral hand swelling and numbness for approx 8-12 weeks. Did have antiinflammatory from urgent care that helped with swelling, but ran out so continues to have swelling, pain, and numbness/tingling. Hx of cervical spondylosis and radiculopathy, recommended surgery, but currently only being managed with pain management. Has been having in L hand for approx 1 year, now in bilateral hands, and now having swelling. Tingling/pain mostly located in 1st-3rd digits.       Patient Active Problem List   Diagnosis    Depression    Obesity, Class I, BMI 30-34.9    Tobacco use    Cervical radiculopathy    Chronic bilateral low back pain with bilateral sciatica    Chronic neck pain    Lumbosacral radiculopathy    Cervical spondylosis without myelopathy    Bilateral carpal tunnel syndrome    Opioid dependence       Family History   Problem Relation Age of Onset    Breast cancer Mother 65        Stage II S/p BL Mastectomy    No Known Problems Father      Past Surgical History:   Procedure Laterality Date    EPIDURAL STEROID INJECTION INTO CERVICAL SPINE N/A 1/14/2021    Procedure: C6/7 IL MARTY;  Surgeon: Chris Rubin MD;  Location: [facility];  Service: Pain Management;  Laterality: N/A;    WISDOM TOOTH EXTRACTION           Current Outpatient Medications:     cyclobenzaprine (FLEXERIL) 10 MG tablet, Take 1 tablet (10 mg total) by mouth 3 (three) times daily as needed., Disp: 60 tablet, Rfl: 6    gabapentin (NEURONTIN) 300 MG capsule, TAKE 1 CAPSULE BY MOUTH DAILY 1 WITH LUNCH AND TWO EVERY EVENING, Disp: 120 capsule, Rfl: 11    ondansetron (ZOFRAN-ODT) 4 MG TbDL, Take 4 mg by mouth every 6 (six) hours as needed., Disp: , Rfl:     HYDROcodone-acetaminophen (NORCO) 7.5-325 mg per tablet, Take 1  "tablet by mouth 3 (three) times daily., Disp: , Rfl:     meloxicam (MOBIC) 15 MG tablet, Take 1 tablet (15 mg total) by mouth daily as needed for Pain., Disp: 30 tablet, Rfl: 5    Review of Systems   Constitutional:  Negative for appetite change, chills, fatigue and fever.   Respiratory:  Negative for cough, shortness of breath and wheezing.    Cardiovascular:  Negative for chest pain, palpitations and leg swelling.   Gastrointestinal:  Negative for abdominal pain, diarrhea, nausea and vomiting.   Musculoskeletal:  Positive for back pain, joint swelling (bilateral hands) and neck pain.   Neurological:  Positive for numbness (bilateral hands). Negative for dizziness, speech difficulty, light-headedness and headaches.     Objective:   /80 (BP Location: Left arm, Patient Position: Sitting, BP Method: Medium (Manual))   Pulse 89   Temp 98.8 °F (37.1 °C)   Ht 5' 4" (1.626 m)   Wt 83.7 kg (184 lb 8.4 oz)   SpO2 97%   BMI 31.67 kg/m²      Physical Exam  Constitutional:       General: She is not in acute distress.     Appearance: Normal appearance. She is obese. She is not ill-appearing.   Cardiovascular:      Rate and Rhythm: Normal rate and regular rhythm.      Pulses: Normal pulses.      Heart sounds: Normal heart sounds. No murmur heard.    No friction rub. No gallop.   Pulmonary:      Effort: Pulmonary effort is normal. No respiratory distress.      Breath sounds: Normal breath sounds. No wheezing.   Musculoskeletal:      Right hand: Swelling present. Decreased range of motion.      Left hand: Swelling present. Decreased range of motion.      Right lower leg: No edema.      Left lower leg: No edema.      Comments: + tinels on L hand/wrist   Skin:     General: Skin is warm and dry.      Coloration: Skin is not pale.      Findings: No erythema.   Neurological:      Mental Status: She is alert and oriented to person, place, and time.   Psychiatric:         Mood and Affect: Mood normal.       Assessment & Plan " "    Problem List Items Addressed This Visit          Neuro    Cervical radiculopathy    Cervical spondylosis without myelopathy    Current Assessment & Plan     May be contributor to hand symptoms. Also may have some carpal tunnel so will send to hand specialist for further eval.               Psychiatric    Opioid dependence    Current Assessment & Plan     Followed by pain management.              Endocrine    Obesity, Class I, BMI 30-34.9       Other    Tobacco use    Current Assessment & Plan     Counseled on the importance of smoke cessation in order to improve overall quality of life and reduce risk of future comorbidities. Declined smoke cessation program.               Other Visit Diagnoses       Bilateral hand swelling    -  Primary    Relevant Medications    meloxicam (MOBIC) 15 MG tablet    Other Relevant Orders    Ambulatory referral/consult to Orthopedics    BEBE Screen w/Reflex    C-REACTIVE PROTEIN    Sedimentation rate    COMPREHENSIVE METABOLIC PANEL    Bilateral hand pain        Relevant Medications    meloxicam (MOBIC) 15 MG tablet    Other Relevant Orders    Ambulatory referral/consult to Orthopedics    BEBE Screen w/Reflex    C-REACTIVE PROTEIN    Sedimentation rate    RHEUMATOID FACTOR    Colon cancer screening        Relevant Orders    Ambulatory referral/consult to Endo Procedure     Breast cancer screening by mammogram        Relevant Orders    Mammo Digital Screening Bilat w/ Fredo        Labs to r/o rheum disorders. Referral to hand specialist for possible carpal tunnel with diagnosis pulled from care everywhere. Also recommend following up with neurology/neurosurgery for possible cervical factor for symptoms.     Follow up if symptoms worsen or fail to improve.            Portions of this note may have been created with voice recognition software. Occasional "wrong-word" or "sound-a-like" substitutions may have occurred due to the inherent limitations of voice recognition software. " Please, read the note carefully and recognize, using context, where substitutions have occurred.

## 2023-04-04 NOTE — ASSESSMENT & PLAN NOTE
May be contributor to hand symptoms. Also may have some carpal tunnel so will send to hand specialist for further eval.

## 2023-04-05 LAB
ANA PATTERN 1: NORMAL
ANA SER QL IF: POSITIVE
ANA TITR SER IF: NORMAL {TITER}
ERYTHROCYTE [SEDIMENTATION RATE] IN BLOOD BY PHOTOMETRIC METHOD: 11 MM/HR (ref 0–36)
RHEUMATOID FACT SERPL-ACNC: <13 IU/ML (ref 0–15)

## 2023-04-06 DIAGNOSIS — R76.8 POSITIVE ANA (ANTINUCLEAR ANTIBODY): Primary | ICD-10-CM

## 2023-04-06 LAB
ANTI SM ANTIBODY: 0.13 RATIO (ref 0–0.99)
ANTI SM/RNP ANTIBODY: 0.09 RATIO (ref 0–0.99)
ANTI-SM INTERPRETATION: NEGATIVE
ANTI-SM/RNP INTERPRETATION: NEGATIVE
ANTI-SSA ANTIBODY: 0.09 RATIO (ref 0–0.99)
ANTI-SSA INTERPRETATION: NEGATIVE
ANTI-SSB ANTIBODY: 0.08 RATIO (ref 0–0.99)
ANTI-SSB INTERPRETATION: NEGATIVE
DSDNA AB SER-ACNC: NORMAL [IU]/ML

## 2023-04-10 DIAGNOSIS — M79.641 BILATERAL HAND PAIN: Primary | ICD-10-CM

## 2023-04-10 DIAGNOSIS — M79.642 BILATERAL HAND PAIN: Primary | ICD-10-CM

## 2023-04-12 ENCOUNTER — OFFICE VISIT (OUTPATIENT)
Dept: ORTHOPEDICS | Facility: CLINIC | Age: 53
End: 2023-04-12
Payer: COMMERCIAL

## 2023-04-12 ENCOUNTER — HOSPITAL ENCOUNTER (OUTPATIENT)
Dept: RADIOLOGY | Facility: HOSPITAL | Age: 53
Discharge: HOME | End: 2023-04-12
Attending: ORTHOPAEDIC SURGERY
Payer: COMMERCIAL

## 2023-04-12 VITALS — WEIGHT: 184 LBS | BODY MASS INDEX: 31.41 KG/M2 | HEIGHT: 64 IN

## 2023-04-12 DIAGNOSIS — G56.03 BILATERAL CARPAL TUNNEL SYNDROME: Primary | ICD-10-CM

## 2023-04-12 DIAGNOSIS — R20.2 NUMBNESS AND TINGLING: Primary | ICD-10-CM

## 2023-04-12 DIAGNOSIS — M79.641 BILATERAL HAND PAIN: ICD-10-CM

## 2023-04-12 DIAGNOSIS — M79.642 BILATERAL HAND PAIN: ICD-10-CM

## 2023-04-12 DIAGNOSIS — M79.89 BILATERAL HAND SWELLING: ICD-10-CM

## 2023-04-12 DIAGNOSIS — R20.0 NUMBNESS AND TINGLING: Primary | ICD-10-CM

## 2023-04-12 PROCEDURE — 99999 PR PBB SHADOW E&M-EST. PATIENT-LVL III: CPT | Mod: PBBFAC,,, | Performed by: ORTHOPAEDIC SURGERY

## 2023-04-12 PROCEDURE — 1159F PR MEDICATION LIST DOCUMENTED IN MEDICAL RECORD: ICD-10-PCS | Mod: CPTII,S$GLB,, | Performed by: ORTHOPAEDIC SURGERY

## 2023-04-12 PROCEDURE — 73130 X-RAY EXAM OF HAND: CPT | Mod: 26,,, | Performed by: RADIOLOGY

## 2023-04-12 PROCEDURE — 3008F PR BODY MASS INDEX (BMI) DOCUMENTED: ICD-10-PCS | Mod: CPTII,S$GLB,, | Performed by: ORTHOPAEDIC SURGERY

## 2023-04-12 PROCEDURE — 3008F BODY MASS INDEX DOCD: CPT | Mod: CPTII,S$GLB,, | Performed by: ORTHOPAEDIC SURGERY

## 2023-04-12 PROCEDURE — 20526 THER INJECTION CARP TUNNEL: CPT | Mod: 50,S$GLB,, | Performed by: ORTHOPAEDIC SURGERY

## 2023-04-12 PROCEDURE — 1160F RVW MEDS BY RX/DR IN RCRD: CPT | Mod: CPTII,S$GLB,, | Performed by: ORTHOPAEDIC SURGERY

## 2023-04-12 PROCEDURE — 73130 XR HAND COMPLETE 3 VIEWS BILATERAL: ICD-10-PCS | Mod: 26,,, | Performed by: RADIOLOGY

## 2023-04-12 PROCEDURE — 99999 PR PBB SHADOW E&M-EST. PATIENT-LVL III: ICD-10-PCS | Mod: PBBFAC,,, | Performed by: ORTHOPAEDIC SURGERY

## 2023-04-12 PROCEDURE — 1160F PR REVIEW ALL MEDS BY PRESCRIBER/CLIN PHARMACIST DOCUMENTED: ICD-10-PCS | Mod: CPTII,S$GLB,, | Performed by: ORTHOPAEDIC SURGERY

## 2023-04-12 PROCEDURE — 99203 OFFICE O/P NEW LOW 30 MIN: CPT | Mod: 25,S$GLB,, | Performed by: ORTHOPAEDIC SURGERY

## 2023-04-12 PROCEDURE — 20526 CARPAL TUNNEL: ICD-10-PCS | Mod: 50,S$GLB,, | Performed by: ORTHOPAEDIC SURGERY

## 2023-04-12 PROCEDURE — 73130 X-RAY EXAM OF HAND: CPT | Mod: TC,50

## 2023-04-12 PROCEDURE — 1159F MED LIST DOCD IN RCRD: CPT | Mod: CPTII,S$GLB,, | Performed by: ORTHOPAEDIC SURGERY

## 2023-04-12 PROCEDURE — 99203 PR OFFICE/OUTPT VISIT, NEW, LEVL III, 30-44 MIN: ICD-10-PCS | Mod: 25,S$GLB,, | Performed by: ORTHOPAEDIC SURGERY

## 2023-04-12 RX ORDER — TRIAMCINOLONE ACETONIDE 40 MG/ML
40 INJECTION, SUSPENSION INTRA-ARTICULAR; INTRAMUSCULAR
Status: DISCONTINUED | OUTPATIENT
Start: 2023-04-12 | End: 2023-09-22

## 2023-04-12 RX ADMIN — TRIAMCINOLONE ACETONIDE 40 MG: 40 INJECTION, SUSPENSION INTRA-ARTICULAR; INTRAMUSCULAR at 01:04
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
## 2023-04-12 NOTE — PROCEDURES
Carpal Tunnel    Date/Time: 4/12/2023 1:00 PM  Performed by: Vitaly Devries MD  Authorized by: Vitaly Devries MD     Consent Done?:  Yes (Verbal)  Indications:  Pain  Timeout: prior to procedure the correct patient, procedure, and site was verified    Prep: patient was prepped and draped in usual sterile fashion      Local anesthesia used?: Yes    Local anesthetic:  Lidocaine 2% without epinephrine  Anesthetic total (ml):  2    Location:  Wrist  Site:  R carpal tunnel and L carpal tunnel  Ultrasonic Guidance for Needle Placement?: No    Needle size:  25 G  Medications:  40 mg triamcinolone acetonide 40 mg/mL; 40 mg triamcinolone acetonide 40 mg/mL

## 2023-04-18 DIAGNOSIS — M48.02 CERVICAL STENOSIS OF SPINE: ICD-10-CM

## 2023-04-18 DIAGNOSIS — M54.12 RADICULOPATHY, CERVICAL REGION: ICD-10-CM

## 2023-04-18 RX ORDER — CYCLOBENZAPRINE HCL 10 MG
TABLET ORAL
Qty: 60 TABLET | Refills: 6 | Status: SHIPPED | OUTPATIENT
Start: 2023-04-18 | End: 2023-10-09

## 2023-04-19 ENCOUNTER — PATIENT MESSAGE (OUTPATIENT)
Dept: ADMINISTRATIVE | Facility: HOSPITAL | Age: 53
End: 2023-04-19
Payer: COMMERCIAL

## 2023-04-26 ENCOUNTER — TELEPHONE (OUTPATIENT)
Dept: PHYSICAL MEDICINE AND REHAB | Facility: CLINIC | Age: 53
End: 2023-04-26
Payer: COMMERCIAL

## 2023-04-26 NOTE — TELEPHONE ENCOUNTER
----- Message from Jazmin Perry sent at 4/26/2023  3:00 PM CDT -----  Regarding: PT CALLED  Name of Who is Calling:  PT CALLED        What is the request in detail:    THE TO NEEDS TO BE RESCHEDULED FOR HER EMG PLEASE ADVISE      Can the clinic reply by [facility]:  NO        What Number to Call Back if not in [facility]: [phone] (home)

## 2023-04-26 NOTE — TELEPHONE ENCOUNTER
----- Message from Joselyn Valdivia sent at 4/26/2023  3:21 PM CDT -----  Pt would like to reschedule the appt on 04/27/2023. Call the pt back at .[phone]. Thx EL

## 2023-05-23 ENCOUNTER — OFFICE VISIT (OUTPATIENT)
Dept: ORTHOPEDICS | Facility: CLINIC | Age: 53
End: 2023-05-23
Payer: COMMERCIAL

## 2023-05-23 ENCOUNTER — OFFICE VISIT (OUTPATIENT)
Dept: PHYSICAL MEDICINE AND REHAB | Facility: CLINIC | Age: 53
End: 2023-05-23
Payer: COMMERCIAL

## 2023-05-23 ENCOUNTER — OFFICE VISIT (OUTPATIENT)
Dept: RHEUMATOLOGY | Facility: CLINIC | Age: 53
End: 2023-05-23
Payer: COMMERCIAL

## 2023-05-23 VITALS — WEIGHT: 184 LBS | BODY MASS INDEX: 31.41 KG/M2 | HEIGHT: 64 IN

## 2023-05-23 VITALS
WEIGHT: 184.94 LBS | HEART RATE: 87 BPM | BODY MASS INDEX: 31.57 KG/M2 | RESPIRATION RATE: 18 BRPM | HEIGHT: 64 IN | DIASTOLIC BLOOD PRESSURE: 84 MMHG | SYSTOLIC BLOOD PRESSURE: 136 MMHG

## 2023-05-23 VITALS — RESPIRATION RATE: 13 BRPM | HEIGHT: 64 IN | WEIGHT: 184 LBS | BODY MASS INDEX: 31.41 KG/M2

## 2023-05-23 DIAGNOSIS — G56.03 BILATERAL CARPAL TUNNEL SYNDROME: Primary | ICD-10-CM

## 2023-05-23 DIAGNOSIS — M54.12 CERVICAL RADICULOPATHY: ICD-10-CM

## 2023-05-23 DIAGNOSIS — R76.8 POSITIVE ANA (ANTINUCLEAR ANTIBODY): ICD-10-CM

## 2023-05-23 PROCEDURE — 95911 PR NERVE CONDUCTION STUDY; 9-10 STUDIES: ICD-10-PCS | Mod: S$GLB,,, | Performed by: PHYSICAL MEDICINE & REHABILITATION

## 2023-05-23 PROCEDURE — 3075F SYST BP GE 130 - 139MM HG: CPT | Mod: CPTII,S$GLB,, | Performed by: STUDENT IN AN ORGANIZED HEALTH CARE EDUCATION/TRAINING PROGRAM

## 2023-05-23 PROCEDURE — 99999 PR PBB SHADOW E&M-EST. PATIENT-LVL III: CPT | Mod: PBBFAC,,, | Performed by: PHYSICAL MEDICINE & REHABILITATION

## 2023-05-23 PROCEDURE — 3008F PR BODY MASS INDEX (BMI) DOCUMENTED: ICD-10-PCS | Mod: CPTII,S$GLB,, | Performed by: ORTHOPAEDIC SURGERY

## 2023-05-23 PROCEDURE — 99214 OFFICE O/P EST MOD 30 MIN: CPT | Mod: S$GLB,,, | Performed by: ORTHOPAEDIC SURGERY

## 2023-05-23 PROCEDURE — 99999 PR PBB SHADOW E&M-EST. PATIENT-LVL III: ICD-10-PCS | Mod: PBBFAC,,, | Performed by: ORTHOPAEDIC SURGERY

## 2023-05-23 PROCEDURE — 1160F RVW MEDS BY RX/DR IN RCRD: CPT | Mod: CPTII,S$GLB,, | Performed by: STUDENT IN AN ORGANIZED HEALTH CARE EDUCATION/TRAINING PROGRAM

## 2023-05-23 PROCEDURE — 95886 MUSC TEST DONE W/N TEST COMP: CPT | Mod: S$GLB,,, | Performed by: PHYSICAL MEDICINE & REHABILITATION

## 2023-05-23 PROCEDURE — 99499 NO LOS: ICD-10-PCS | Mod: S$GLB,,, | Performed by: PHYSICAL MEDICINE & REHABILITATION

## 2023-05-23 PROCEDURE — 95886 PR EMG COMPLETE, W/ NERVE CONDUCTION STUDIES, 5+ MUSCLES: ICD-10-PCS | Mod: S$GLB,,, | Performed by: PHYSICAL MEDICINE & REHABILITATION

## 2023-05-23 PROCEDURE — 3008F PR BODY MASS INDEX (BMI) DOCUMENTED: ICD-10-PCS | Mod: CPTII,S$GLB,, | Performed by: STUDENT IN AN ORGANIZED HEALTH CARE EDUCATION/TRAINING PROGRAM

## 2023-05-23 PROCEDURE — 3008F BODY MASS INDEX DOCD: CPT | Mod: CPTII,S$GLB,, | Performed by: STUDENT IN AN ORGANIZED HEALTH CARE EDUCATION/TRAINING PROGRAM

## 2023-05-23 PROCEDURE — 3079F PR MOST RECENT DIASTOLIC BLOOD PRESSURE 80-89 MM HG: ICD-10-PCS | Mod: CPTII,S$GLB,, | Performed by: STUDENT IN AN ORGANIZED HEALTH CARE EDUCATION/TRAINING PROGRAM

## 2023-05-23 PROCEDURE — 99999 PR PBB SHADOW E&M-EST. PATIENT-LVL IV: ICD-10-PCS | Mod: PBBFAC,,, | Performed by: STUDENT IN AN ORGANIZED HEALTH CARE EDUCATION/TRAINING PROGRAM

## 2023-05-23 PROCEDURE — 99999 PR PBB SHADOW E&M-EST. PATIENT-LVL III: CPT | Mod: PBBFAC,,, | Performed by: ORTHOPAEDIC SURGERY

## 2023-05-23 PROCEDURE — 99499 UNLISTED E&M SERVICE: CPT | Mod: S$GLB,,, | Performed by: PHYSICAL MEDICINE & REHABILITATION

## 2023-05-23 PROCEDURE — 1159F MED LIST DOCD IN RCRD: CPT | Mod: CPTII,S$GLB,, | Performed by: STUDENT IN AN ORGANIZED HEALTH CARE EDUCATION/TRAINING PROGRAM

## 2023-05-23 PROCEDURE — 99214 PR OFFICE/OUTPT VISIT, EST, LEVL IV, 30-39 MIN: ICD-10-PCS | Mod: S$GLB,,, | Performed by: ORTHOPAEDIC SURGERY

## 2023-05-23 PROCEDURE — 99204 PR OFFICE/OUTPT VISIT, NEW, LEVL IV, 45-59 MIN: ICD-10-PCS | Mod: S$GLB,,, | Performed by: STUDENT IN AN ORGANIZED HEALTH CARE EDUCATION/TRAINING PROGRAM

## 2023-05-23 PROCEDURE — 1160F RVW MEDS BY RX/DR IN RCRD: CPT | Mod: CPTII,S$GLB,, | Performed by: ORTHOPAEDIC SURGERY

## 2023-05-23 PROCEDURE — 3008F BODY MASS INDEX DOCD: CPT | Mod: CPTII,S$GLB,, | Performed by: ORTHOPAEDIC SURGERY

## 2023-05-23 PROCEDURE — 99204 OFFICE O/P NEW MOD 45 MIN: CPT | Mod: S$GLB,,, | Performed by: STUDENT IN AN ORGANIZED HEALTH CARE EDUCATION/TRAINING PROGRAM

## 2023-05-23 PROCEDURE — 1159F MED LIST DOCD IN RCRD: CPT | Mod: CPTII,S$GLB,, | Performed by: ORTHOPAEDIC SURGERY

## 2023-05-23 PROCEDURE — 3079F DIAST BP 80-89 MM HG: CPT | Mod: CPTII,S$GLB,, | Performed by: STUDENT IN AN ORGANIZED HEALTH CARE EDUCATION/TRAINING PROGRAM

## 2023-05-23 PROCEDURE — 1159F PR MEDICATION LIST DOCUMENTED IN MEDICAL RECORD: ICD-10-PCS | Mod: CPTII,S$GLB,, | Performed by: STUDENT IN AN ORGANIZED HEALTH CARE EDUCATION/TRAINING PROGRAM

## 2023-05-23 PROCEDURE — 1160F PR REVIEW ALL MEDS BY PRESCRIBER/CLIN PHARMACIST DOCUMENTED: ICD-10-PCS | Mod: CPTII,S$GLB,, | Performed by: STUDENT IN AN ORGANIZED HEALTH CARE EDUCATION/TRAINING PROGRAM

## 2023-05-23 PROCEDURE — 1159F PR MEDICATION LIST DOCUMENTED IN MEDICAL RECORD: ICD-10-PCS | Mod: CPTII,S$GLB,, | Performed by: ORTHOPAEDIC SURGERY

## 2023-05-23 PROCEDURE — 99999 PR PBB SHADOW E&M-EST. PATIENT-LVL III: ICD-10-PCS | Mod: PBBFAC,,, | Performed by: PHYSICAL MEDICINE & REHABILITATION

## 2023-05-23 PROCEDURE — 3075F PR MOST RECENT SYSTOLIC BLOOD PRESS GE 130-139MM HG: ICD-10-PCS | Mod: CPTII,S$GLB,, | Performed by: STUDENT IN AN ORGANIZED HEALTH CARE EDUCATION/TRAINING PROGRAM

## 2023-05-23 PROCEDURE — 1160F PR REVIEW ALL MEDS BY PRESCRIBER/CLIN PHARMACIST DOCUMENTED: ICD-10-PCS | Mod: CPTII,S$GLB,, | Performed by: ORTHOPAEDIC SURGERY

## 2023-05-23 PROCEDURE — 99999 PR PBB SHADOW E&M-EST. PATIENT-LVL IV: CPT | Mod: PBBFAC,,, | Performed by: STUDENT IN AN ORGANIZED HEALTH CARE EDUCATION/TRAINING PROGRAM

## 2023-05-23 PROCEDURE — 95911 NRV CNDJ TEST 9-10 STUDIES: CPT | Mod: S$GLB,,, | Performed by: PHYSICAL MEDICINE & REHABILITATION
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
## 2023-05-23 NOTE — PROGRESS NOTES
[facility]   [address] [facility]  [place] [address]  Phone: [phone]        Full Name: Jael Olguin YOB: 1970  Patient ID: [id number]      Visit Date: 5/23/2023 13:57  Age: 52 Years  Examining Physician: Dr Monterroso  Referring Physician: Dr Devries  Conclusion: upper ext  Chief Complaint   Patient presents with    Hand Pain       HPI: This is a 52 y.o.  female being seen in clinic today for evaluation of chronic hand achy pain and numbness with swelling and radiation up her arm.  Her right hand is worse than the left, but she has more pain in her left arm/shoulder.  NSAIDs have provided some relief with swelling and achy pain    History obtained from patient    Past family, medical, social, and surgical history reviewed in chart    Review of Systems:     General- denies lethargy, weight change, fever, chills  Head/neck- denies swallowing difficulties  ENT- denies hearing changes  Cardiovascular-denies chest pain  Pulmonary- denies shortness of breath  GI- denies constipation or bowel incontinence  - denies bladder incontinence  Skin- denies wounds or rashes  Musculoskeletal- denies weakness, + pain  Neurologic- + numbness and tingling  Psychiatric- +depression, denies anxiety  Lymphatic-denies swelling  Endocrine- denies hypoglycemic symptoms/DM history  All other pertinent systems negative     Physical Examination:  General: Well developed, well nourished female, NAD  HEENT:NCAT EOMI bilaterally   Pulmonary:Normal respirations    Spinal Examination: CERVICAL  Active ROM is within normal limits.  Inspection: No deformity of spinal alignment.  Palpation: No vertebral tenderness to percussion.      Musculoskeletal Tests:  Phalen:+bilaterally  Elbow compression (ulnar): neg  Tinels at wrist: neg    Bilateral Upper and Lower Extremities:  Pulses are 2+ at radial bilaterally.  Shoulder/Elbow/Wrist/Hand ROM wnl  Hip/Knee/Ankle ROM   Bilateral Extremities show normal capillary refill.   No signs of cyanosis, rubor, edema, skin changes, or dysvascular changes of appendages.  Nails appear intact.    Neurological Exam:  Cranial Nerves:  II-XII grossly intact    Manual Muscle Testing: (Motor 5=normal)  5/5 strength bilateral upper extremities    No focal atrophy is noted of either upper extremity.    Bilateral Reflexes:  Pierson's response is absent bilaterally.    Sensation: tested to light touch  - intact in arms except dec at 1st-3rd fingertips    Gait: Narrow base and good arm swing.      Entire procedure explained to patient prior to proceeding.  Verbal consent obtained      Sensory NCS      Nerve / Sites Rec. Site Onset Lat Peak Lat NP Amp PP Amp Segments Distance Velocity     ms ms µV µV  mm m/s   R Median - Digit II (Antidromic)      Wrist Dig II 2.10 4.75 10.2 12.9 Wrist - Dig  67   L Median - Digit II (Antidromic)      Wrist Dig II 3.79 4.75 11.2 10.8 Wrist - Dig  37   R Ulnar - Digit V (Antidromic)      Wrist Dig V 2.23 3.04 19.2 28.9 Wrist - Dig V 140 63   L Ulnar - Digit V (Antidromic)      Wrist Dig V 2.08 2.88 22.4 26.6 Wrist - Dig V 140 67   R Radial - Anatomical snuff box (Forearm)      Forearm Wrist 1.48 2.04 13.4 14.4 Forearm - Wrist 100 68   L Radial - Anatomical snuff box (Forearm)      Forearm Wrist 1.52 2.04 24.5 23.1 Forearm - Wrist 100 66                   Motor NCS      Nerve / Sites Muscle Latency Amplitude Amp % Duration Segments Distance Lat Diff Velocity     ms mV % ms  mm ms m/s   R Median - APB      Wrist APB 5.38 5.6 100 6.56 Wrist - APB 80        Elbow APB 8.29 4.0 70.5 8.67 Elbow - Wrist 200 2.92 69   L Median - APB      Wrist APB 4.92 8.0 100 5.75 Wrist - APB 80        Elbow APB 8.40 7.0 87.2 7.25 Elbow - Wrist 180 3.48 52   R Ulnar - ADM      Wrist ADM 2.81 8.0 100 5.73 Wrist - ADM 80        B.Elbow ADM 6.19 6.7 83.7 5.92 B.Elbow - Wrist 220 3.38 65      A.Elbow ADM 8.19 6.4 79.5 6.15 A.Elbow - B.Elbow 120 2.00 60   L Ulnar - ADM      Wrist ADM 2.60 6.3  100 7.06 Wrist - ADM 80        B.Elbow ADM 6.10 6.8 108 6.94 B.Elbow - Wrist 210 3.50 60      A.Elbow ADM 8.21 6.5 102 6.46 A.Elbow - B.Elbow 120 2.10 57               EMG Summary Table     Spontaneous MUAP Recruitment   Muscle Nerve Roots IA Fib PSW Fasc H.F. Amp Dur. PPP Pattern   L. First dorsal interosseous Ulnar C8-T1 N None None None None N N N N   L. Biceps brachii Musculocutaneous C5-C6 N None None None None N N 1+ 1+   L. Triceps brachii Radial C6-C8 N None None None None N N N N   L. Pronator teres Median C6-C7 N None None None None N N N 1+   L. Brachioradialis Radial C5-C6 N None None None None N N N 1+   L. Deltoid Axillary C5-C6 N None None None None N N N N   R. Biceps brachii Musculocutaneous C5-C6 N None None None None N N N N           INTERPRETATION    -Bilateral median motor nerve conduction study showed prolonged latency, normal amplitude, and normal conduction velocity  -Bilateral median sensory nerve conduction study showed prolonged peak latency and amplitude  -Bilateral ulnar motor nerve conduction study showed normal latency, amplitude, and conduction velocity  -Bilateral ulnar sensory nerve conduction study showed normal peak latency and amplitude  -Bilateral radial sensory nerve conduction study showed normal peak latency and amplitude  -Needle EMG examination performed to above mentioned muscles     IMPRESSION   ABNORMAL study  2. 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 There is a mild chronic radiculopathy of the left C6 nerve root    PLAN  Discussed in detail for greater than 30 minutes about diagnosis and treatment plan    1. Follow up with referring provider: Dr. Vitaly Stafford*  2. Handouts on CTS and radic provided  3. This study is good for one year. If symptoms worsen or do not improve, please re-consult.    Devi Monterroso M.D.  Physical Medicine and Rehab
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
## 2023-05-24 DIAGNOSIS — Z01.818 PREOPERATIVE EXAMINATION: ICD-10-CM

## 2023-05-24 DIAGNOSIS — G56.03 BILATERAL CARPAL TUNNEL SYNDROME: Primary | ICD-10-CM

## 2023-05-25 DIAGNOSIS — Z01.818 PREOP TESTING: Primary | ICD-10-CM

## 2023-06-29 ENCOUNTER — TELEPHONE (OUTPATIENT)
Dept: ORTHOPEDICS | Facility: CLINIC | Age: 53
End: 2023-06-29
Payer: COMMERCIAL

## 2023-06-29 NOTE — TELEPHONE ENCOUNTER
----- Message from Rdaha Alejandro MA sent at 6/27/2023 11:19 AM CDT -----  Regarding: FW: surgery 7/14  Im just sending to you so you will know I tried to call her also.     ----- Message -----  From: Areli Roth RN  Sent: 6/27/2023  11:14 AM CDT  To: Kayce Haider Staff  Subject: surgery 7/14                                     Good Morning.    Pt was a now show [facility] appointment and Labs.    Thank you,    MAXINE

## 2023-06-29 NOTE — TELEPHONE ENCOUNTER
Left message in regards to upcoming surgery - patient missed her preadmit appt - she will need her preop labs and appt with [facility] or surgery will have to be rescheduled

## 2023-07-11 ENCOUNTER — TELEPHONE (OUTPATIENT)
Dept: ORTHOPEDICS | Facility: CLINIC | Age: 53
End: 2023-07-11
Payer: COMMERCIAL

## 2023-07-11 DIAGNOSIS — Z01.818 PREOP TESTING: Primary | ICD-10-CM

## 2023-07-11 NOTE — TELEPHONE ENCOUNTER
----- Message from Radha Alejandro MA sent at 7/11/2023  3:20 PM CDT -----  Regarding: FW: no show    ----- Message -----  From: Lizeth Paz RN  Sent: 7/11/2023   3:10 PM CDT  To: Kayce Haider Staff  Subject: no show                                          Hello, this pt is a NO SHOW for [facility], LABS & EKG, once again. No answer when I called her.   Thanks

## 2023-07-11 NOTE — TELEPHONE ENCOUNTER
Called and left detailed message stating her surgery would be canceled for 7/14/23 unless she called our office back tomorrow and got rescheduled for preop appt and testing    Patient has no showed and rescheduled several times

## 2023-07-13 ENCOUNTER — TELEPHONE (OUTPATIENT)
Dept: ORTHOPEDICS | Facility: CLINIC | Age: 53
End: 2023-07-13
Payer: COMMERCIAL

## 2023-07-13 NOTE — TELEPHONE ENCOUNTER
Left message for patient stating surgery is being canceled due to no show for preadmit appt and for preop testing

## 2023-09-22 ENCOUNTER — OFFICE VISIT (OUTPATIENT)
Dept: INTERNAL MEDICINE | Facility: CLINIC | Age: 53
End: 2023-09-22
Payer: COMMERCIAL

## 2023-09-22 VITALS
OXYGEN SATURATION: 98 % | BODY MASS INDEX: 30.11 KG/M2 | WEIGHT: 176.38 LBS | TEMPERATURE: 99 F | HEIGHT: 64 IN | DIASTOLIC BLOOD PRESSURE: 68 MMHG | HEART RATE: 97 BPM | SYSTOLIC BLOOD PRESSURE: 110 MMHG

## 2023-09-22 DIAGNOSIS — M54.41 ACUTE RIGHT-SIDED LOW BACK PAIN WITH RIGHT-SIDED SCIATICA: Primary | ICD-10-CM

## 2023-09-22 DIAGNOSIS — F11.20 UNCOMPLICATED OPIOID DEPENDENCE: ICD-10-CM

## 2023-09-22 PROCEDURE — 96372 THER/PROPH/DIAG INJ SC/IM: CPT | Mod: S$GLB,,, | Performed by: NURSE PRACTITIONER

## 2023-09-22 PROCEDURE — 3008F BODY MASS INDEX DOCD: CPT | Mod: CPTII,S$GLB,, | Performed by: NURSE PRACTITIONER

## 2023-09-22 PROCEDURE — 1159F PR MEDICATION LIST DOCUMENTED IN MEDICAL RECORD: ICD-10-PCS | Mod: CPTII,S$GLB,, | Performed by: NURSE PRACTITIONER

## 2023-09-22 PROCEDURE — 96372 PR INJECTION,THERAP/PROPH/DIAG2ST, IM OR SUBCUT: ICD-10-PCS | Mod: S$GLB,,, | Performed by: NURSE PRACTITIONER

## 2023-09-22 PROCEDURE — 3078F PR MOST RECENT DIASTOLIC BLOOD PRESSURE < 80 MM HG: ICD-10-PCS | Mod: CPTII,S$GLB,, | Performed by: NURSE PRACTITIONER

## 2023-09-22 PROCEDURE — 99999 PR PBB SHADOW E&M-EST. PATIENT-LVL IV: CPT | Mod: PBBFAC,,, | Performed by: NURSE PRACTITIONER

## 2023-09-22 PROCEDURE — 99213 PR OFFICE/OUTPT VISIT, EST, LEVL III, 20-29 MIN: ICD-10-PCS | Mod: 25,S$GLB,, | Performed by: NURSE PRACTITIONER

## 2023-09-22 PROCEDURE — 99213 OFFICE O/P EST LOW 20 MIN: CPT | Mod: 25,S$GLB,, | Performed by: NURSE PRACTITIONER

## 2023-09-22 PROCEDURE — 1159F MED LIST DOCD IN RCRD: CPT | Mod: CPTII,S$GLB,, | Performed by: NURSE PRACTITIONER

## 2023-09-22 PROCEDURE — 3074F SYST BP LT 130 MM HG: CPT | Mod: CPTII,S$GLB,, | Performed by: NURSE PRACTITIONER

## 2023-09-22 PROCEDURE — 3078F DIAST BP <80 MM HG: CPT | Mod: CPTII,S$GLB,, | Performed by: NURSE PRACTITIONER

## 2023-09-22 PROCEDURE — 3074F PR MOST RECENT SYSTOLIC BLOOD PRESSURE < 130 MM HG: ICD-10-PCS | Mod: CPTII,S$GLB,, | Performed by: NURSE PRACTITIONER

## 2023-09-22 PROCEDURE — 99999 PR PBB SHADOW E&M-EST. PATIENT-LVL IV: ICD-10-PCS | Mod: PBBFAC,,, | Performed by: NURSE PRACTITIONER

## 2023-09-22 PROCEDURE — 3008F PR BODY MASS INDEX (BMI) DOCUMENTED: ICD-10-PCS | Mod: CPTII,S$GLB,, | Performed by: NURSE PRACTITIONER

## 2023-09-22 RX ORDER — TRIAMCINOLONE ACETONIDE 40 MG/ML
40 INJECTION, SUSPENSION INTRA-ARTICULAR; INTRAMUSCULAR
Status: COMPLETED | OUTPATIENT
Start: 2023-09-22 | End: 2023-09-22

## 2023-09-22 RX ADMIN — TRIAMCINOLONE ACETONIDE 40 MG: 40 INJECTION, SUSPENSION INTRA-ARTICULAR; INTRAMUSCULAR at 01:09

## 2023-09-22 NOTE — LETTER
09/22/2023               [facility]  [address]  [place] [address]  Phone: [phone]  Fax: [phone]   09/22/2023    Patient: Jael Olguin   YOB: 1970   Date of Visit: 9/22/2023       To Whom it May Concern:    Jael Olguin was seen in my clinic on 9/22/2023. She may return to work on 9/23/2023 .    If you have any questions or concerns, please don't hesitate to call.    Sincerely,         Makayla Sr, MILINDP-C

## 2023-09-22 NOTE — PROGRESS NOTES
Subjective:       Patient ID: Jael Olguin is a 53 y.o. female.    Chief Complaint: Sciatica    Mrs. Olguin presents to clinic for right sciatica pain.  History of lumbar radiculopathy, followed by pain management.  Intermittent flare-ups of sciatic nerve pain.  Usually resolves with meloxicam, Flexeril, and gabapentin.  On Norco for chronic low back pain.  States sciatic pain has been ongoing times the past week with no relief from NSAID and muscle relaxer.      Back Pain  This is a recurrent problem. The current episode started in the past 7 days. The problem occurs constantly. The problem is unchanged. The pain is present in the lumbar spine and sacro-iliac. The pain radiates to the right thigh and right knee. The pain is severe. Associated symptoms include leg pain, numbness, tingling and weakness. Pertinent negatives include no bladder incontinence, bowel incontinence, chest pain, dysuria, pelvic pain or perianal numbness. She has tried NSAIDs, muscle relaxant and analgesics for the symptoms. The treatment provided no relief.     Patient Active Problem List   Diagnosis    Depression    Obesity, Class I, BMI 30-34.9    Tobacco use    Cervical radiculopathy    Chronic bilateral low back pain with bilateral sciatica    Chronic neck pain    Lumbosacral radiculopathy    Cervical spondylosis without myelopathy    Bilateral carpal tunnel syndrome    Opioid dependence       Family History   Problem Relation Age of Onset    Breast cancer Mother 65        Stage II S/p BL Mastectomy    No Known Problems Father      Past Surgical History:   Procedure Laterality Date    EPIDURAL STEROID INJECTION INTO CERVICAL SPINE N/A 1/14/2021    Procedure: C6/7 IL MARTY;  Surgeon: Chris Rubin MD;  Location: [facility];  Service: Pain Management;  Laterality: N/A;    WISDOM TOOTH EXTRACTION           Current Outpatient Medications:     cyclobenzaprine (FLEXERIL) 10 MG tablet, TAKE ONE TABLET BY MOUTH THREE  "TIMES DAILY AS NEEDED, Disp: 60 tablet, Rfl: 6    gabapentin (NEURONTIN) 300 MG capsule, TAKE 1 CAPSULE BY MOUTH DAILY 1 WITH LUNCH AND TWO EVERY EVENING, Disp: 120 capsule, Rfl: 11    HYDROcodone-acetaminophen (NORCO) 7.5-325 mg per tablet, Take 1 tablet by mouth 3 (three) times daily., Disp: , Rfl:     meloxicam (MOBIC) 15 MG tablet, Take 1 tablet (15 mg total) by mouth daily as needed for Pain., Disp: 30 tablet, Rfl: 5    ondansetron (ZOFRAN-ODT) 4 MG TbDL, Take 4 mg by mouth every 6 (six) hours as needed., Disp: , Rfl:   No current facility-administered medications for this visit.    Review of Systems   Cardiovascular:  Negative for chest pain and leg swelling.   Gastrointestinal:  Negative for bowel incontinence.   Genitourinary:  Negative for bladder incontinence, difficulty urinating, dysuria and pelvic pain.   Musculoskeletal:  Positive for back pain.   Skin:  Negative for color change, rash and wound.   Neurological:  Positive for tingling, weakness and numbness.       Objective:   /68 (BP Location: Left arm, Patient Position: Sitting, BP Method: Large (Manual))   Pulse 97   Temp 98.8 °F (37.1 °C)   Ht 5' 4" (1.626 m)   Wt 80 kg (176 lb 5.9 oz)   LMP 09/08/2023   SpO2 98%   BMI 30.27 kg/m²      Physical Exam  Vitals reviewed.   Constitutional:       General: She is not in acute distress.     Appearance: Normal appearance. She is not ill-appearing.   HENT:      Head: Normocephalic.   Cardiovascular:      Rate and Rhythm: Normal rate.   Pulmonary:      Effort: Pulmonary effort is normal. No respiratory distress.   Musculoskeletal:      Lumbar back: Tenderness present. Positive right straight leg raise test.        Back:    Neurological:      Mental Status: She is alert and oriented to person, place, and time.   Psychiatric:         Mood and Affect: Mood normal.         Behavior: Behavior normal.         Assessment & Plan     1. Acute right-sided low back pain with right-sided " "sciatica  Comments:  Kenalog 40 mg IM today in clinic. Continue NSAID and muscle relaxer. Handout on sciatic stretches provided.  Orders:  -     triamcinolone acetonide injection 40 mg    2. Uncomplicated opioid dependence  Assessment & Plan:  Stable. Followed by pain management        Administrations This Visit       triamcinolone acetonide injection 40 mg       Admin Date  09/22/2023 Action  Given Dose  40 mg Route  Intramuscular Administered By  Ange Low MA                  Administrations This Visit       triamcinolone acetonide injection 40 mg       Admin Date  09/22/2023 Action  Given Dose  40 mg Route  Intramuscular Administered By  Ange Low MA Kelsey Rutherford, FNP-C      Portions of this note may have been created with voice recognition software. Occasional "wrong-word" or "sound-a-like" substitutions may have occurred due to the inherent limitations of voice recognition software. Please, read the note carefully and recognize, using context, where substitutions have occurred.     "

## 2023-09-29 DIAGNOSIS — M79.89 BILATERAL HAND SWELLING: ICD-10-CM

## 2023-09-29 DIAGNOSIS — M79.642 BILATERAL HAND PAIN: ICD-10-CM

## 2023-09-29 DIAGNOSIS — M79.641 BILATERAL HAND PAIN: ICD-10-CM

## 2023-09-29 RX ORDER — MELOXICAM 15 MG/1
15 TABLET ORAL DAILY PRN
Qty: 30 TABLET | Refills: 5 | Status: SHIPPED | OUTPATIENT
Start: 2023-09-29

## 2023-10-09 DIAGNOSIS — M54.12 RADICULOPATHY, CERVICAL REGION: ICD-10-CM

## 2023-10-09 DIAGNOSIS — M48.02 CERVICAL STENOSIS OF SPINE: ICD-10-CM

## 2023-10-09 RX ORDER — CYCLOBENZAPRINE HCL 10 MG
TABLET ORAL
Qty: 60 TABLET | Refills: 2 | Status: SHIPPED | OUTPATIENT
Start: 2023-10-09 | End: 2024-01-24

## 2024-01-23 DIAGNOSIS — M48.02 CERVICAL STENOSIS OF SPINE: ICD-10-CM

## 2024-01-23 DIAGNOSIS — M54.12 RADICULOPATHY, CERVICAL REGION: ICD-10-CM

## 2024-01-24 RX ORDER — CYCLOBENZAPRINE HCL 10 MG
TABLET ORAL
Qty: 60 TABLET | Refills: 0 | Status: SHIPPED | OUTPATIENT
Start: 2024-01-24 | End: 2024-02-17

## 2024-02-08 ENCOUNTER — PATIENT OUTREACH (OUTPATIENT)
Dept: ADMINISTRATIVE | Facility: HOSPITAL | Age: 54
End: 2024-02-08

## 2024-02-16 DIAGNOSIS — M48.02 CERVICAL STENOSIS OF SPINE: ICD-10-CM

## 2024-02-16 DIAGNOSIS — M54.12 RADICULOPATHY, CERVICAL REGION: ICD-10-CM

## 2024-02-17 RX ORDER — CYCLOBENZAPRINE HCL 10 MG
TABLET ORAL
Qty: 60 TABLET | Refills: 0 | Status: SHIPPED | OUTPATIENT
Start: 2024-02-17 | End: 2024-03-19

## 2024-03-18 DIAGNOSIS — M48.02 CERVICAL STENOSIS OF SPINE: ICD-10-CM

## 2024-03-18 DIAGNOSIS — M54.12 RADICULOPATHY, CERVICAL REGION: ICD-10-CM

## 2024-03-19 RX ORDER — CYCLOBENZAPRINE HCL 10 MG
TABLET ORAL
Qty: 60 TABLET | Refills: 0 | Status: SHIPPED | OUTPATIENT
Start: 2024-03-19 | End: 2024-04-15

## 2024-04-15 DIAGNOSIS — M54.12 RADICULOPATHY, CERVICAL REGION: ICD-10-CM

## 2024-04-15 DIAGNOSIS — M79.642 BILATERAL HAND PAIN: ICD-10-CM

## 2024-04-15 DIAGNOSIS — M79.89 BILATERAL HAND SWELLING: ICD-10-CM

## 2024-04-15 DIAGNOSIS — M48.02 CERVICAL STENOSIS OF SPINE: ICD-10-CM

## 2024-04-15 DIAGNOSIS — M79.641 BILATERAL HAND PAIN: ICD-10-CM

## 2024-04-15 RX ORDER — CYCLOBENZAPRINE HCL 10 MG
TABLET ORAL
Qty: 60 TABLET | Refills: 0 | Status: SHIPPED | OUTPATIENT
Start: 2024-04-15 | End: 2024-05-14

## 2024-04-15 RX ORDER — MELOXICAM 15 MG/1
15 TABLET ORAL DAILY PRN
Qty: 30 TABLET | Refills: 5 | OUTPATIENT
Start: 2024-04-15

## 2024-04-15 RX ORDER — MELOXICAM 15 MG/1
15 TABLET ORAL DAILY PRN
Qty: 30 TABLET | Refills: 0 | Status: SHIPPED | OUTPATIENT
Start: 2024-04-15 | End: 2024-05-14

## 2024-05-05 ENCOUNTER — HOSPITAL ENCOUNTER (EMERGENCY)
Facility: HOSPITAL | Age: 54
Discharge: HOME | End: 2024-05-05
Attending: STUDENT IN AN ORGANIZED HEALTH CARE EDUCATION/TRAINING PROGRAM

## 2024-05-05 VITALS
OXYGEN SATURATION: 99 % | WEIGHT: 161.25 LBS | HEART RATE: 98 BPM | RESPIRATION RATE: 20 BRPM | HEIGHT: 62 IN | BODY MASS INDEX: 29.67 KG/M2 | SYSTOLIC BLOOD PRESSURE: 130 MMHG | TEMPERATURE: 98 F | DIASTOLIC BLOOD PRESSURE: 60 MMHG

## 2024-05-05 DIAGNOSIS — H92.02 ACUTE OTALGIA, LEFT: Primary | ICD-10-CM

## 2024-05-05 DIAGNOSIS — U07.1 COVID-19: ICD-10-CM

## 2024-05-05 LAB
CTP QC/QA: YES
GROUP A STREP, MOLECULAR: NEGATIVE
INFLUENZA A, MOLECULAR: NEGATIVE
INFLUENZA B, MOLECULAR: NEGATIVE
SARS-COV-2 RDRP RESP QL NAA+PROBE: NEGATIVE
SPECIMEN SOURCE: NORMAL

## 2024-05-05 PROCEDURE — 87502 INFLUENZA DNA AMP PROBE: CPT | Mod: ER | Performed by: STUDENT IN AN ORGANIZED HEALTH CARE EDUCATION/TRAINING PROGRAM

## 2024-05-05 PROCEDURE — 87635 SARS-COV-2 COVID-19 AMP PRB: CPT | Mod: ER | Performed by: STUDENT IN AN ORGANIZED HEALTH CARE EDUCATION/TRAINING PROGRAM

## 2024-05-05 PROCEDURE — 99283 EMERGENCY DEPT VISIT LOW MDM: CPT | Mod: ER

## 2024-05-05 PROCEDURE — 87651 STREP A DNA AMP PROBE: CPT | Mod: ER | Performed by: STUDENT IN AN ORGANIZED HEALTH CARE EDUCATION/TRAINING PROGRAM

## 2024-05-05 RX ORDER — AMOXICILLIN AND CLAVULANATE POTASSIUM 875; 125 MG/1; MG/1
1 TABLET, FILM COATED ORAL 2 TIMES DAILY
Qty: 14 TABLET | Refills: 0 | Status: SHIPPED | OUTPATIENT
Start: 2024-05-05 | End: 2024-06-18 | Stop reason: ALTCHOICE

## 2024-05-05 NOTE — DISCHARGE INSTRUCTIONS
Please follow-up with your PCP. Please call on the next business day to schedule this appointment.    Please take the Augmentin as prescribed for potential ear infection. Please understand that all medications have potential side effects. Please read the package insert for all medications that we have prescribed/recommended. Please call your primary care physician or return to the ER with any questions or concerns you may have. Please take only the dosage that is prescribed.    Return to the ER with any new/worsening symptoms, fevers/chills, chest pain, shortness of breath, or any other concerns.

## 2024-05-14 DIAGNOSIS — M79.89 BILATERAL HAND SWELLING: ICD-10-CM

## 2024-05-14 DIAGNOSIS — M79.642 BILATERAL HAND PAIN: ICD-10-CM

## 2024-05-14 DIAGNOSIS — M54.12 RADICULOPATHY, CERVICAL REGION: ICD-10-CM

## 2024-05-14 DIAGNOSIS — M79.641 BILATERAL HAND PAIN: ICD-10-CM

## 2024-05-14 DIAGNOSIS — M48.02 CERVICAL STENOSIS OF SPINE: ICD-10-CM

## 2024-05-14 RX ORDER — MELOXICAM 15 MG/1
15 TABLET ORAL
Qty: 30 TABLET | Refills: 0 | Status: SHIPPED | OUTPATIENT
Start: 2024-05-14 | End: 2024-06-18 | Stop reason: SDUPTHER

## 2024-05-14 RX ORDER — CYCLOBENZAPRINE HCL 10 MG
TABLET ORAL
Qty: 60 TABLET | Refills: 0 | Status: SHIPPED | OUTPATIENT
Start: 2024-05-14 | End: 2024-06-18 | Stop reason: SDUPTHER

## 2024-05-29 ENCOUNTER — PATIENT OUTREACH (OUTPATIENT)
Dept: ADMINISTRATIVE | Facility: HOSPITAL | Age: 54
End: 2024-05-29

## 2024-05-29 NOTE — PROGRESS NOTES
No answer, LVM & sent Portal msg.   VBHM Score: 5     Cervical Cancer Screening  Colon Cancer Screening  Mammogram  Hemoglobin A1c  LDCT Lung Screen                       Health Maintenance Topic(s) Outreach Outcomes & Actions Taken:       Additional Notes:                 Care Management, Digital Medicine, and/or Education Referrals    OPCM Risk Score: 39.6                 Additional Notes:

## 2024-06-04 ENCOUNTER — TELEPHONE (OUTPATIENT)
Dept: INTERNAL MEDICINE | Facility: CLINIC | Age: 54
End: 2024-06-04
Payer: MEDICAID

## 2024-06-04 NOTE — TELEPHONE ENCOUNTER
----- Message from Lona Proctor sent at 6/4/2024  2:02 PM CDT -----  Contact: Patient, [phone]  Calling to schedule an established Medicaid appointment for annual physical. Please call her. Thanks.

## 2024-06-17 ENCOUNTER — TELEPHONE (OUTPATIENT)
Dept: INTERNAL MEDICINE | Facility: CLINIC | Age: 54
End: 2024-06-17
Payer: MEDICAID

## 2024-06-17 NOTE — TELEPHONE ENCOUNTER
----- Message from Candice Treviño sent at 6/17/2024 10:55 AM CDT -----  Regarding: appt  Name of Who is Calling:pt         What is the request in detail: Requesting ep appt   Please advise           Can the clinic reply by [facility]: no         What Number to [facility] Back [facility]:Telephone Information:  Mobile          [phone]

## 2024-06-17 NOTE — TELEPHONE ENCOUNTER
----- Message from Deja Rodriguez sent at 6/17/2024  3:10 PM CDT -----  Contact: Jael   Jael would like a call back about the status of a previous message she left to make an appt

## 2024-06-18 ENCOUNTER — OFFICE VISIT (OUTPATIENT)
Dept: INTERNAL MEDICINE | Facility: CLINIC | Age: 54
End: 2024-06-18
Payer: MEDICAID

## 2024-06-18 VITALS
SYSTOLIC BLOOD PRESSURE: 116 MMHG | DIASTOLIC BLOOD PRESSURE: 80 MMHG | HEART RATE: 82 BPM | BODY MASS INDEX: 29.45 KG/M2 | HEIGHT: 62 IN | WEIGHT: 160.06 LBS | TEMPERATURE: 96 F | OXYGEN SATURATION: 98 %

## 2024-06-18 DIAGNOSIS — L73.9 FOLLICULITIS: Primary | ICD-10-CM

## 2024-06-18 DIAGNOSIS — G56.03 BILATERAL CARPAL TUNNEL SYNDROME: ICD-10-CM

## 2024-06-18 DIAGNOSIS — M48.02 CERVICAL STENOSIS OF SPINE: ICD-10-CM

## 2024-06-18 DIAGNOSIS — M54.17 LUMBOSACRAL RADICULOPATHY: ICD-10-CM

## 2024-06-18 DIAGNOSIS — M47.812 CERVICAL SPONDYLOSIS WITHOUT MYELOPATHY: ICD-10-CM

## 2024-06-18 PROCEDURE — 1159F MED LIST DOCD IN RCRD: CPT | Mod: CPTII,,, | Performed by: NURSE PRACTITIONER

## 2024-06-18 PROCEDURE — 99213 OFFICE O/P EST LOW 20 MIN: CPT | Mod: PBBFAC,PO | Performed by: NURSE PRACTITIONER

## 2024-06-18 PROCEDURE — 99999 PR PBB SHADOW E&M-EST. PATIENT-LVL III: CPT | Mod: PBBFAC,,, | Performed by: NURSE PRACTITIONER

## 2024-06-18 PROCEDURE — 1160F RVW MEDS BY RX/DR IN RCRD: CPT | Mod: CPTII,,, | Performed by: NURSE PRACTITIONER

## 2024-06-18 PROCEDURE — 3074F SYST BP LT 130 MM HG: CPT | Mod: CPTII,,, | Performed by: NURSE PRACTITIONER

## 2024-06-18 PROCEDURE — 3079F DIAST BP 80-89 MM HG: CPT | Mod: CPTII,,, | Performed by: NURSE PRACTITIONER

## 2024-06-18 PROCEDURE — 3008F BODY MASS INDEX DOCD: CPT | Mod: CPTII,,, | Performed by: NURSE PRACTITIONER

## 2024-06-18 PROCEDURE — 99214 OFFICE O/P EST MOD 30 MIN: CPT | Mod: S$PBB,,, | Performed by: NURSE PRACTITIONER

## 2024-06-18 RX ORDER — GABAPENTIN 600 MG/1
600 TABLET ORAL NIGHTLY PRN
Qty: 90 TABLET | Refills: 1 | Status: SHIPPED | OUTPATIENT
Start: 2024-06-18

## 2024-06-18 RX ORDER — CYCLOBENZAPRINE HCL 10 MG
10 TABLET ORAL 3 TIMES DAILY PRN
Qty: 60 TABLET | Refills: 5 | Status: SHIPPED | OUTPATIENT
Start: 2024-06-18

## 2024-06-18 RX ORDER — MUPIROCIN 20 MG/G
OINTMENT TOPICAL 3 TIMES DAILY
Qty: 22 G | Refills: 0 | Status: SHIPPED | OUTPATIENT
Start: 2024-06-18

## 2024-06-18 RX ORDER — GABAPENTIN 300 MG/1
300 CAPSULE ORAL 2 TIMES DAILY
Qty: 180 CAPSULE | Refills: 3 | Status: SHIPPED | OUTPATIENT
Start: 2024-06-18

## 2024-06-18 RX ORDER — DOXYCYCLINE 100 MG/1
100 CAPSULE ORAL 2 TIMES DAILY
Qty: 14 CAPSULE | Refills: 0 | Status: SHIPPED | OUTPATIENT
Start: 2024-06-18 | End: 2024-06-25

## 2024-06-18 RX ORDER — MELOXICAM 15 MG/1
15 TABLET ORAL DAILY PRN
Qty: 30 TABLET | Refills: 11 | Status: SHIPPED | OUTPATIENT
Start: 2024-06-18

## 2024-06-18 NOTE — PROGRESS NOTES
Subjective:       Patient ID: Jael Olguin is a 53 y.o. female.    Chief Complaint: Recurrent Skin Infections (Groin area/)    Mrs. Olguin presents to visit for complaint of abscess to L groin. Noticed over the past few days. Tried warm compressed without any improvement. Area is erythematous. Denies any drainage.     Also needs refill on gabapentin, mobic, and flexeril. No longer seeing pain management.     Abscess  Chronicity:  NewProgression Since Onset: gradually worsening  Location:  Ano-genital (L groin)  Associated Symptoms: no fever, no chills  Characteristics: painful, redness and swelling    Characteristics: not draining, no itching, no dryness, no peeling, no bruising and no blistering    Pain Scale:  7/10  Treatments Tried:  Warm compresses and warm water soaks      Patient Active Problem List   Diagnosis    Depression    Obesity, Class I, BMI 30-34.9    Tobacco use    Cervical radiculopathy    Chronic bilateral low back pain with bilateral sciatica    Chronic neck pain    Lumbosacral radiculopathy    Cervical spondylosis without myelopathy    Bilateral carpal tunnel syndrome    Opioid dependence       Family History   Problem Relation Name Age of Onset    Breast cancer Mother  65        Stage II S/p BL Mastectomy    No Known Problems Father       Past Surgical History:   Procedure Laterality Date    EPIDURAL STEROID INJECTION INTO CERVICAL SPINE N/A 1/14/2021    Procedure: C6/7 IL MARTY;  Surgeon: Chris Rubin MD;  Location: [facility];  Service: Pain Management;  Laterality: N/A;    WISDOM TOOTH EXTRACTION           Current Outpatient Medications:     cyclobenzaprine (FLEXERIL) 10 MG tablet, Take 1 tablet (10 mg total) by mouth 3 (three) times daily as needed for Muscle spasms., Disp: 60 tablet, Rfl: 5    doxycycline (MONODOX) 100 MG capsule, Take 1 capsule (100 mg total) by mouth 2 (two) times daily. for 7 days, Disp: 14 capsule, Rfl: 0    gabapentin (NEURONTIN) 300 MG  "capsule, Take 1 capsule (300 mg total) by mouth 2 (two) times daily., Disp: 180 capsule, Rfl: 3    gabapentin (NEURONTIN) 600 MG tablet, Take 1 tablet (600 mg total) by mouth nightly as needed., Disp: 90 tablet, Rfl: 1    meloxicam (MOBIC) 15 MG tablet, Take 1 tablet (15 mg total) by mouth daily as needed for Pain., Disp: 30 tablet, Rfl: 11    mupirocin (BACTROBAN) 2 % ointment, Apply topically 3 (three) times daily., Disp: 22 g, Rfl: 0    Review of Systems   Constitutional:  Negative for appetite change, chills, fatigue and fever.   Respiratory:  Negative for shortness of breath.    Musculoskeletal:  Positive for arthralgias and back pain.   Skin:  Positive for color change and wound.   Neurological:  Negative for dizziness, light-headedness and headaches.       Objective:   /80 (BP Location: Left arm, Patient Position: Sitting, BP Method: Medium (Manual))   Pulse 82   Temp 96.2 °F (35.7 °C) (Tympanic)   Ht 5' 2" (1.575 m)   Wt 72.6 kg (160 lb 0.9 oz)   LMP 06/14/2024   SpO2 98%   BMI 29.27 kg/m²      Physical Exam  Constitutional:       General: She is not in acute distress.     Appearance: Normal appearance. She is obese. She is not ill-appearing.   Cardiovascular:      Rate and Rhythm: Normal rate.   Pulmonary:      Effort: Pulmonary effort is normal. No respiratory distress.   Skin:     General: Skin is warm and dry.      Coloration: Skin is not pale.      Findings: Abscess (small erythematous area of induration, approx 1 cm. no drainage present) and erythema (approx 1 cm area of erythema to L groin) present.   Neurological:      Mental Status: She is alert and oriented to person, place, and time.   Psychiatric:         Mood and Affect: Mood normal.         Behavior: Behavior normal.         Assessment & Plan     Problem List Items Addressed This Visit          Neuro    Lumbosacral radiculopathy    Relevant Medications    gabapentin (NEURONTIN) 300 MG capsule    gabapentin (NEURONTIN) 600 MG tablet " "   meloxicam (MOBIC) 15 MG tablet    Cervical spondylosis without myelopathy    Relevant Medications    gabapentin (NEURONTIN) 300 MG capsule    gabapentin (NEURONTIN) 600 MG tablet    meloxicam (MOBIC) 15 MG tablet    Bilateral carpal tunnel syndrome    Relevant Medications    meloxicam (MOBIC) 15 MG tablet     Other Visit Diagnoses       Folliculitis    -  Primary    Relevant Medications    mupirocin (BACTROBAN) 2 % ointment    doxycycline (MONODOX) 100 MG capsule    Cervical stenosis of spine        Relevant Medications    cyclobenzaprine (FLEXERIL) 10 MG tablet        Monitor for signs of worsening infection. Continue warm compresses.     Follow up if symptoms worsen or fail to improve.          Portions of this note may have been created with voice recognition software. Occasional "wrong-word" or "sound-a-like" substitutions may have occurred due to the inherent limitations of voice recognition software. Please, read the note carefully and recognize, using context, where substitutions have occurred.       "

## 2024-07-14 ENCOUNTER — HOSPITAL ENCOUNTER (EMERGENCY)
Facility: HOSPITAL | Age: 54
Discharge: HOME | End: 2024-07-14
Attending: EMERGENCY MEDICINE
Payer: MEDICAID

## 2024-07-14 VITALS
HEIGHT: 63 IN | BODY MASS INDEX: 28.35 KG/M2 | RESPIRATION RATE: 18 BRPM | HEART RATE: 81 BPM | TEMPERATURE: 99 F | OXYGEN SATURATION: 100 % | WEIGHT: 160 LBS | DIASTOLIC BLOOD PRESSURE: 73 MMHG | SYSTOLIC BLOOD PRESSURE: 156 MMHG

## 2024-07-14 DIAGNOSIS — M54.32 SCIATICA, LEFT SIDE: Primary | ICD-10-CM

## 2024-07-14 PROCEDURE — 96372 THER/PROPH/DIAG INJ SC/IM: CPT | Performed by: EMERGENCY MEDICINE

## 2024-07-14 PROCEDURE — 63600175 PHARM REV CODE 636 W HCPCS: Mod: ER | Performed by: EMERGENCY MEDICINE

## 2024-07-14 PROCEDURE — 99284 EMERGENCY DEPT VISIT MOD MDM: CPT | Mod: 25,ER

## 2024-07-14 RX ORDER — METHYLPREDNISOLONE 4 MG/1
TABLET ORAL
Qty: 1 EACH | Refills: 0 | Status: SHIPPED | OUTPATIENT
Start: 2024-07-14 | End: 2024-07-23 | Stop reason: ALTCHOICE

## 2024-07-14 RX ORDER — KETOROLAC TROMETHAMINE 30 MG/ML
15 INJECTION, SOLUTION INTRAMUSCULAR; INTRAVENOUS
Status: COMPLETED | OUTPATIENT
Start: 2024-07-14 | End: 2024-07-14

## 2024-07-14 RX ORDER — KETOROLAC TROMETHAMINE 30 MG/ML
15 INJECTION, SOLUTION INTRAMUSCULAR; INTRAVENOUS
Status: DISCONTINUED | OUTPATIENT
Start: 2024-07-14 | End: 2024-07-14

## 2024-07-14 RX ORDER — DEXAMETHASONE SODIUM PHOSPHATE 4 MG/ML
8 INJECTION, SOLUTION INTRA-ARTICULAR; INTRALESIONAL; INTRAMUSCULAR; INTRAVENOUS; SOFT TISSUE
Status: COMPLETED | OUTPATIENT
Start: 2024-07-14 | End: 2024-07-14

## 2024-07-14 RX ADMIN — DEXAMETHASONE SODIUM PHOSPHATE 8 MG: 4 INJECTION INTRA-ARTICULAR; INTRALESIONAL; INTRAMUSCULAR; INTRAVENOUS; SOFT TISSUE at 04:07

## 2024-07-14 RX ADMIN — KETOROLAC TROMETHAMINE 15 MG: 30 INJECTION, SOLUTION INTRAMUSCULAR at 04:07

## 2024-07-14 NOTE — ED TRIAGE NOTES
Pain radiates from low back down left leg. Reports history of sciatic nerve pain. Denies any injury. Unrelieved with lidocaine patches and advil/tylenol. Reports burning sensation down leg and to outer left calf. Symptoms x 4 days
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
## 2024-07-23 ENCOUNTER — TELEPHONE (OUTPATIENT)
Dept: INTERNAL MEDICINE | Facility: CLINIC | Age: 54
End: 2024-07-23
Payer: MEDICAID

## 2024-07-23 ENCOUNTER — OFFICE VISIT (OUTPATIENT)
Dept: INTERNAL MEDICINE | Facility: CLINIC | Age: 54
End: 2024-07-23
Payer: MEDICAID

## 2024-07-23 VITALS
RESPIRATION RATE: 16 BRPM | TEMPERATURE: 98 F | WEIGHT: 173.75 LBS | DIASTOLIC BLOOD PRESSURE: 60 MMHG | HEIGHT: 63 IN | HEART RATE: 100 BPM | BODY MASS INDEX: 30.79 KG/M2 | OXYGEN SATURATION: 96 % | SYSTOLIC BLOOD PRESSURE: 120 MMHG

## 2024-07-23 DIAGNOSIS — M54.42 CHRONIC LEFT-SIDED LOW BACK PAIN WITH LEFT-SIDED SCIATICA: Primary | ICD-10-CM

## 2024-07-23 DIAGNOSIS — G89.29 CHRONIC LEFT-SIDED LOW BACK PAIN WITH LEFT-SIDED SCIATICA: Primary | ICD-10-CM

## 2024-07-23 DIAGNOSIS — M54.17 LUMBOSACRAL RADICULOPATHY: ICD-10-CM

## 2024-07-23 PROCEDURE — 99213 OFFICE O/P EST LOW 20 MIN: CPT | Mod: PBBFAC,PO | Performed by: NURSE PRACTITIONER

## 2024-07-23 PROCEDURE — 3074F SYST BP LT 130 MM HG: CPT | Mod: CPTII,,, | Performed by: NURSE PRACTITIONER

## 2024-07-23 PROCEDURE — 96372 THER/PROPH/DIAG INJ SC/IM: CPT | Mod: PBBFAC,PO

## 2024-07-23 PROCEDURE — 99999 PR PBB SHADOW E&M-EST. PATIENT-LVL III: CPT | Mod: PBBFAC,,, | Performed by: NURSE PRACTITIONER

## 2024-07-23 PROCEDURE — 3078F DIAST BP <80 MM HG: CPT | Mod: CPTII,,, | Performed by: NURSE PRACTITIONER

## 2024-07-23 PROCEDURE — 99213 OFFICE O/P EST LOW 20 MIN: CPT | Mod: 25,S$PBB,, | Performed by: NURSE PRACTITIONER

## 2024-07-23 PROCEDURE — 3008F BODY MASS INDEX DOCD: CPT | Mod: CPTII,,, | Performed by: NURSE PRACTITIONER

## 2024-07-23 PROCEDURE — 1160F RVW MEDS BY RX/DR IN RCRD: CPT | Mod: CPTII,,, | Performed by: NURSE PRACTITIONER

## 2024-07-23 PROCEDURE — 1159F MED LIST DOCD IN RCRD: CPT | Mod: CPTII,,, | Performed by: NURSE PRACTITIONER

## 2024-07-23 PROCEDURE — 99999PBSHW PR PBB SHADOW TECHNICAL ONLY FILED TO HB: Mod: PBBFAC,,,

## 2024-07-23 RX ORDER — NAPROXEN 500 MG/1
500 TABLET ORAL 2 TIMES DAILY PRN
Qty: 30 TABLET | Refills: 0 | Status: SHIPPED | OUTPATIENT
Start: 2024-07-23

## 2024-07-23 RX ORDER — TIZANIDINE 4 MG/1
4 TABLET ORAL EVERY 6 HOURS PRN
Qty: 40 TABLET | Refills: 0 | Status: SHIPPED | OUTPATIENT
Start: 2024-07-23

## 2024-07-23 RX ORDER — KETOROLAC TROMETHAMINE 30 MG/ML
30 INJECTION, SOLUTION INTRAMUSCULAR; INTRAVENOUS
Status: COMPLETED | OUTPATIENT
Start: 2024-07-23 | End: 2024-07-23

## 2024-07-23 RX ADMIN — KETOROLAC TROMETHAMINE 30 MG: 30 INJECTION, SOLUTION INTRAMUSCULAR; INTRAVENOUS at 03:07

## 2024-07-23 NOTE — PROGRESS NOTES
Subjective:       Patient ID: Jael Olguin is a 53 y.o. female.    Chief Complaint: Sciatica (Down left side)    Mrs. Olguin presents a visit for complaints of left-sided sciatica approximately 2 weeks.  She was evaluated in the ER on 07/14, treated with injection of Decadron and Toradol, and discharged with a prescription of Medrol Dosepak.  She completed Medrol Dosepak on Friday, and did have some days of relief, but symptoms started back a couple of days ago.  Pain radiates from left lower back down to left lateral lower leg.  She also has been using Tylenol/Advil combination pill without any relief.  Denies any urinary or GI symptoms.  Previously was followed by Dr. Wood with pain management, but has not seen him in several months.        Patient Active Problem List   Diagnosis    Depression    Obesity, Class I, BMI 30-34.9    Tobacco use    Cervical radiculopathy    Chronic bilateral low back pain with bilateral sciatica    Chronic neck pain    Lumbosacral radiculopathy    Cervical spondylosis without myelopathy    Bilateral carpal tunnel syndrome    Opioid dependence       Family History   Problem Relation Name Age of Onset    Breast cancer Mother  65        Stage II S/p BL Mastectomy    No Known Problems Father       Past Surgical History:   Procedure Laterality Date    EPIDURAL STEROID INJECTION INTO CERVICAL SPINE N/A 1/14/2021    Procedure: C6/7 IL MARTY;  Surgeon: Chris Rubin MD;  Location: [facility];  Service: Pain Management;  Laterality: N/A;    WISDOM TOOTH EXTRACTION           Current Outpatient Medications:     cyclobenzaprine (FLEXERIL) 10 MG tablet, Take 1 tablet (10 mg total) by mouth 3 (three) times daily as needed for Muscle spasms., Disp: 60 tablet, Rfl: 5    gabapentin (NEURONTIN) 300 MG capsule, Take 1 capsule (300 mg total) by mouth 2 (two) times daily., Disp: 180 capsule, Rfl: 3    gabapentin (NEURONTIN) 600 MG tablet, Take 1 tablet (600 mg total) by mouth  "nightly as needed., Disp: 90 tablet, Rfl: 1    meloxicam (MOBIC) 15 MG tablet, Take 1 tablet (15 mg total) by mouth daily as needed for Pain., Disp: 30 tablet, Rfl: 11    mupirocin (BACTROBAN) 2 % ointment, Apply topically 3 (three) times daily., Disp: 22 g, Rfl: 0    naproxen (NAPROSYN) 500 MG tablet, Take 1 tablet (500 mg total) by mouth 2 (two) times daily as needed., Disp: 30 tablet, Rfl: 0    tiZANidine (ZANAFLEX) 4 MG tablet, Take 1 tablet (4 mg total) by mouth every 6 (six) hours as needed (muscle pain)., Disp: 40 tablet, Rfl: 0  No current facility-administered medications for this visit.    Review of Systems   Constitutional:  Negative for chills, fatigue and fever.   Respiratory:  Negative for cough and shortness of breath.    Cardiovascular:  Negative for chest pain and palpitations.   Gastrointestinal:  Negative for abdominal pain, diarrhea, nausea and vomiting.   Genitourinary:  Negative for dysuria, flank pain, frequency and urgency.   Musculoskeletal:  Positive for back pain and gait problem. Negative for arthralgias and myalgias.   Neurological:  Negative for dizziness, syncope, light-headedness, numbness and headaches.       Objective:   /60 (BP Location: Left arm, Patient Position: Sitting, BP Method: Large (Manual))   Pulse 100   Temp 98.3 °F (36.8 °C) (Oral)   Resp 16   Ht 5' 3" (1.6 m)   Wt 78.8 kg (173 lb 11.6 oz)   LMP 07/22/2024   SpO2 96%   BMI 30.77 kg/m²      Physical Exam  Constitutional:       General: She is not in acute distress.     Appearance: Normal appearance. She is not ill-appearing.   Cardiovascular:      Rate and Rhythm: Normal rate.   Pulmonary:      Effort: Pulmonary effort is normal. No respiratory distress.   Abdominal:      Palpations: Abdomen is soft.      Tenderness: There is no abdominal tenderness. There is no guarding.   Musculoskeletal:      Lumbar back: Tenderness (bilateral SI and paraspinal muscle areas) present. No bony tenderness. Decreased range " "of motion.   Skin:     General: Skin is warm and dry.      Coloration: Skin is not pale.      Findings: No erythema.   Neurological:      Mental Status: She is alert and oriented to person, place, and time.   Psychiatric:         Mood and Affect: Mood normal.         Behavior: Behavior normal.         Assessment & Plan     Problem List Items Addressed This Visit          Neuro    Lumbosacral radiculopathy    Current Assessment & Plan     History of chronic lumbar radiculopathy.  On gabapentin, Flexeril, and Mobic chronically.  Trial of changing anti-inflammatories and muscle relaxer.  Toradol injection given today.  Previously followed by pain management.            Other Visit Diagnoses       Chronic left-sided low back pain with left-sided sciatica    -  Primary    Relevant Medications    ketorolac injection 30 mg (Completed)    tiZANidine (ZANAFLEX) 4 MG tablet    naproxen (NAPROSYN) 500 MG tablet          Previous imaging reviewed.   Has already been treated with steroid injection and PO steroid.  Toradol injection today.   Stretches  Heating pad  Massages  NSAID/muscle relaxer rx.   Discussed option for PT.   Also recommended following back up with pain management if symptoms persist.     Follow up if symptoms worsen or fail to improve.          Portions of this note may have been created with voice recognition software. Occasional "wrong-word" or "sound-a-like" substitutions may have occurred due to the inherent limitations of voice recognition software. Please, read the note carefully and recognize, using context, where substitutions have occurred.       "

## 2024-07-23 NOTE — ASSESSMENT & PLAN NOTE
History of chronic lumbar radiculopathy.  On gabapentin, Flexeril, and Mobic chronically.  Trial of changing anti-inflammatories and muscle relaxer.  Toradol injection given today.  Previously followed by pain management.

## 2024-07-23 NOTE — TELEPHONE ENCOUNTER
----- Message from Galileo Chen sent at 7/23/2024  2:03 PM CDT -----  Name of Who is Calling:HOUSTON PAULINO [[id number]]                   What is the request in detail:pt needs to get an appt but your calendar is closed please assist                    Can the clinic reply by [facility]: No                   [facility]: [phone]

## 2024-07-23 NOTE — TELEPHONE ENCOUNTER
Returned call to patient, who is complaining of her sciatic nerve. Patient has been schedule for this afternoon

## 2024-08-14 DIAGNOSIS — M54.17 LUMBOSACRAL RADICULOPATHY: ICD-10-CM

## 2024-08-14 DIAGNOSIS — M47.812 CERVICAL SPONDYLOSIS WITHOUT MYELOPATHY: ICD-10-CM

## 2024-08-14 RX ORDER — GABAPENTIN 600 MG/1
600 TABLET ORAL NIGHTLY PRN
Qty: 90 TABLET | Refills: 0 | Status: SHIPPED | OUTPATIENT
Start: 2024-08-14

## 2024-09-05 ENCOUNTER — TELEPHONE (OUTPATIENT)
Dept: FAMILY MEDICINE | Facility: CLINIC | Age: 54
End: 2024-09-05
Payer: MEDICAID

## 2024-09-05 NOTE — TELEPHONE ENCOUNTER
----- Message from Deborah Segundo sent at 9/5/2024 11:59 AM CDT -----  Regarding: apt  Contact: [phone]  Pt calling in  requesting call back to schedule apt please call to discuss further

## 2024-09-09 ENCOUNTER — OFFICE VISIT (OUTPATIENT)
Dept: INTERNAL MEDICINE | Facility: CLINIC | Age: 54
End: 2024-09-09
Payer: MEDICAID

## 2024-09-09 VITALS
TEMPERATURE: 98 F | SYSTOLIC BLOOD PRESSURE: 128 MMHG | HEART RATE: 85 BPM | HEIGHT: 63 IN | RESPIRATION RATE: 16 BRPM | WEIGHT: 177.94 LBS | DIASTOLIC BLOOD PRESSURE: 74 MMHG | OXYGEN SATURATION: 98 % | BODY MASS INDEX: 31.53 KG/M2

## 2024-09-09 DIAGNOSIS — Z12.31 BREAST CANCER SCREENING BY MAMMOGRAM: ICD-10-CM

## 2024-09-09 DIAGNOSIS — M54.41 CHRONIC BILATERAL LOW BACK PAIN WITH RIGHT-SIDED SCIATICA: Primary | ICD-10-CM

## 2024-09-09 DIAGNOSIS — G89.29 CHRONIC BILATERAL LOW BACK PAIN WITH RIGHT-SIDED SCIATICA: Primary | ICD-10-CM

## 2024-09-09 PROCEDURE — 99999PBSHW PR PBB SHADOW TECHNICAL ONLY FILED TO HB: Mod: PBBFAC,,,

## 2024-09-09 PROCEDURE — 1159F MED LIST DOCD IN RCRD: CPT | Mod: CPTII,,, | Performed by: NURSE PRACTITIONER

## 2024-09-09 PROCEDURE — 99999 PR PBB SHADOW E&M-EST. PATIENT-LVL IV: CPT | Mod: PBBFAC,,, | Performed by: NURSE PRACTITIONER

## 2024-09-09 PROCEDURE — 3074F SYST BP LT 130 MM HG: CPT | Mod: CPTII,,, | Performed by: NURSE PRACTITIONER

## 2024-09-09 PROCEDURE — 96372 THER/PROPH/DIAG INJ SC/IM: CPT | Mod: PBBFAC,PO

## 2024-09-09 PROCEDURE — 3008F BODY MASS INDEX DOCD: CPT | Mod: CPTII,,, | Performed by: NURSE PRACTITIONER

## 2024-09-09 PROCEDURE — 99214 OFFICE O/P EST MOD 30 MIN: CPT | Mod: PBBFAC,PO | Performed by: NURSE PRACTITIONER

## 2024-09-09 PROCEDURE — 99213 OFFICE O/P EST LOW 20 MIN: CPT | Mod: 25,S$PBB,, | Performed by: NURSE PRACTITIONER

## 2024-09-09 PROCEDURE — 3078F DIAST BP <80 MM HG: CPT | Mod: CPTII,,, | Performed by: NURSE PRACTITIONER

## 2024-09-09 RX ORDER — NAPROXEN 500 MG/1
500 TABLET ORAL 2 TIMES DAILY PRN
Qty: 60 TABLET | Refills: 5 | Status: SHIPPED | OUTPATIENT
Start: 2024-09-09

## 2024-09-09 RX ORDER — TIZANIDINE 4 MG/1
4 TABLET ORAL EVERY 6 HOURS PRN
Qty: 40 TABLET | Refills: 5 | Status: SHIPPED | OUTPATIENT
Start: 2024-09-09

## 2024-09-09 RX ORDER — KETOROLAC TROMETHAMINE 30 MG/ML
30 INJECTION, SOLUTION INTRAMUSCULAR; INTRAVENOUS
Status: COMPLETED | OUTPATIENT
Start: 2024-09-09 | End: 2024-09-09

## 2024-09-09 RX ADMIN — KETOROLAC TROMETHAMINE 30 MG: 30 INJECTION, SOLUTION INTRAMUSCULAR; INTRAVENOUS at 09:09

## 2024-09-09 NOTE — PROGRESS NOTES
Subjective:       Patient ID: Jael Olguin is a 54 y.o. female.    Chief Complaint: Sciatica (Right sided leg pain)      History of Present Illness    CHIEF COMPLAINT:  The patient presents with worsening right lower back pain that radiates down to the ankle, affecting daily activities such as driving.    HPI:  Ms. Olguin reports right lower back pain progressively worsening over the past 2 weeks, radiating down to the ankle. The pain is severe enough to affect daily activities, particularly driving. The patient has difficulty pressing the gas pedal. The pain is constant, exacerbated by movement, with every step causing discomfort. Hc of chronic sciatica.     The patient denies any specific triggering event for this episode, unlike a previous occurrence during house renovation. The pain is severe enough to cause nausea, which the patient had this morning. The patient reports hot flashes, which may or may not be related to the pain.    Previously, the patient was prescribed Zanaflex for a similar issue on the left side, which provided some relief. Currently, the patient takes gabapentin and advil/tylenol as part of their regular medication regimen. The patient has a history of receiving injections for pain management but has not seen a pain management specialist recently due to insurance changes. The patient's last steroid injection and oral steroid treatment was in July.   The patient denies problems with bowel movements, urination, abdominal pain, dizziness, lightheadedness, and recent falls.        Patient Active Problem List   Diagnosis    Depression    Obesity, Class I, BMI 30-34.9    Tobacco use    Cervical radiculopathy    Chronic bilateral low back pain with bilateral sciatica    Chronic neck pain    Lumbosacral radiculopathy    Cervical spondylosis without myelopathy    Bilateral carpal tunnel syndrome    Opioid dependence         Past Surgical History:   Procedure Laterality Date     EPIDURAL STEROID INJECTION INTO CERVICAL SPINE N/A 1/14/2021    Procedure: C6/7 IL MARTY;  Surgeon: Chris Rubin MD;  Location: [facility];  Service: Pain Management;  Laterality: N/A;    WISDOM TOOTH EXTRACTION           Current Outpatient Medications:     cyclobenzaprine (FLEXERIL) 10 MG tablet, Take 1 tablet (10 mg total) by mouth 3 (three) times daily as needed for Muscle spasms., Disp: 60 tablet, Rfl: 5    gabapentin (NEURONTIN) 300 MG capsule, Take 1 capsule (300 mg total) by mouth 2 (two) times daily., Disp: 180 capsule, Rfl: 3    gabapentin (NEURONTIN) 600 MG tablet, TAKE 1 TABLET (600 MG TOTAL) BY MOUTH NIGHTLY AS NEEDED., Disp: 90 tablet, Rfl: 0    meloxicam (MOBIC) 15 MG tablet, Take 1 tablet (15 mg total) by mouth daily as needed for Pain., Disp: 30 tablet, Rfl: 11    mupirocin (BACTROBAN) 2 % ointment, Apply topically 3 (three) times daily. (Patient not taking: Reported on 9/9/2024), Disp: 22 g, Rfl: 0    naproxen (NAPROSYN) 500 MG tablet, Take 1 tablet (500 mg total) by mouth 2 (two) times daily as needed (pain)., Disp: 60 tablet, Rfl: 5    tiZANidine (ZANAFLEX) 4 MG tablet, Take 1 tablet (4 mg total) by mouth every 6 (six) hours as needed (muscle pain)., Disp: 40 tablet, Rfl: 5  No current facility-administered medications for this visit.    Review of Systems   Constitutional:  Negative for chills, fatigue and fever.   Respiratory:  Negative for cough and shortness of breath.    Cardiovascular:  Negative for chest pain and palpitations.   Gastrointestinal:  Negative for abdominal pain, diarrhea, nausea and vomiting.   Genitourinary:  Negative for dysuria, flank pain, frequency and urgency.   Musculoskeletal:  Positive for back pain and gait problem. Negative for arthralgias and myalgias.   Neurological:  Negative for dizziness, light-headedness, numbness and headaches.       Objective:   /74 (BP Location: Left arm, Patient Position: Sitting, BP Method: X-Large (Manual))   Pulse 85   Temp  "98.4 °F (36.9 °C) (Oral)   Resp 16   Ht 5' 3" (1.6 m)   Wt 80.7 kg (177 lb 14.6 oz)   LMP 09/04/2024   SpO2 98%   BMI 31.52 kg/m²      Physical Exam  Constitutional:       General: She is not in acute distress.     Appearance: Normal appearance. She is not ill-appearing or diaphoretic.   Cardiovascular:      Rate and Rhythm: Normal rate and regular rhythm.      Pulses: Normal pulses.      Heart sounds: Normal heart sounds. No murmur heard.     No friction rub. No gallop.   Pulmonary:      Effort: Pulmonary effort is normal. No respiratory distress.      Breath sounds: Normal breath sounds. No wheezing.   Abdominal:      Palpations: Abdomen is soft.      Tenderness: There is no abdominal tenderness. There is no guarding.   Musculoskeletal:      Lumbar back: Tenderness (bilateral SI and paraspinal muscle areas) present. No bony tenderness. Decreased range of motion.   Skin:     General: Skin is warm and dry.      Coloration: Skin is not pale.      Findings: No erythema.   Neurological:      Mental Status: She is alert and oriented to person, place, and time.   Psychiatric:         Mood and Affect: Mood normal.         Behavior: Behavior normal.         Assessment & Plan     Problem List Items Addressed This Visit    None  Visit Diagnoses       Chronic bilateral low back pain with right-sided sciatica    -  Primary    Relevant Medications    tiZANidine (ZANAFLEX) 4 MG tablet    naproxen (NAPROSYN) 500 MG tablet    ketorolac injection 30 mg (Completed)    Breast cancer screening by mammogram        Relevant Orders    Mammo Digital Screening Bilat w/ Fredo            Assessment & Plan    MEDICAL DECISION MAKING:  - Assessed right lower back pain, noting radiation to ankle and exacerbation with movement  - Considered recent steroid injection in July, limiting current treatment options  - Evaluated appropriateness of Toradol injection for acute pain management  - Reviewed current medication regimen, including " "gabapentin and meloxicam  - Considered restarting previously effective medications (Zanaflex, naproxen) for pain management  - Discussed potential referral to pain management, noting insurance limitations    PATIENT EDUCATION:  - Explained rationale for avoiding steroid injections in close succession    ACTION ITEMS/LIFESTYLE:  - Ms. Olguin to apply heat and ice packs alternately to affected area  - Ms. Olguin to perform stretches for lower back  Stretches  Heating pad  Massages  NSAID/muscle relaxer rx.       MEDICATIONS:  - Restarted Zanaflex  - Restarted naproxen  - Administered Toradol injection in office for acute pain relief    ORDERS:  - Administered Toradol injection in office  - Mammogram ordered    REFERRALS:  - Recommend patient reach out to physiatry for appointment since she is already established, potentially leading to pain management referral    FOLLOW UP:  - Follow up after scheduling mammogram appointment  - Contact the office after reaching out to physiatry department for potential appointment         Follow up if symptoms worsen or fail to improve.          Portions of this note may have been created with voice recognition software. Occasional "wrong-word" or "sound-a-like" substitutions may have occurred due to the inherent limitations of voice recognition software. Please, read the note carefully and recognize, using context, where substitutions have occurred.       This note was generated with the assistance of ambient listening technology. Verbal consent was obtained by the patient and accompanying visitor(s) for the recording of patient appointment to facilitate this note. I attest to having reviewed and edited the generated note for accuracy, though some syntax or spelling errors may persist. Please contact the author of this note for any clarification.       "

## 2024-09-19 DIAGNOSIS — L73.9 FOLLICULITIS: ICD-10-CM

## 2024-09-19 RX ORDER — MUPIROCIN 20 MG/G
OINTMENT TOPICAL
Qty: 22 G | Refills: 0 | Status: SHIPPED | OUTPATIENT
Start: 2024-09-19

## 2024-09-23 ENCOUNTER — HOSPITAL ENCOUNTER (EMERGENCY)
Facility: HOSPITAL | Age: 54
Discharge: HOME | End: 2024-09-23
Attending: EMERGENCY MEDICINE
Payer: MEDICAID

## 2024-09-23 VITALS
OXYGEN SATURATION: 99 % | HEIGHT: 62 IN | HEART RATE: 88 BPM | SYSTOLIC BLOOD PRESSURE: 112 MMHG | WEIGHT: 178.56 LBS | TEMPERATURE: 98 F | RESPIRATION RATE: 16 BRPM | BODY MASS INDEX: 32.86 KG/M2 | DIASTOLIC BLOOD PRESSURE: 72 MMHG

## 2024-09-23 DIAGNOSIS — L02.91 ABSCESS: Primary | ICD-10-CM

## 2024-09-23 PROCEDURE — 25000003 PHARM REV CODE 250: Mod: ER | Performed by: EMERGENCY MEDICINE

## 2024-09-23 PROCEDURE — 99284 EMERGENCY DEPT VISIT MOD MDM: CPT | Mod: 25,ER

## 2024-09-23 PROCEDURE — 10061 I&D ABSCESS COMP/MULTIPLE: CPT | Mod: ER

## 2024-09-23 RX ORDER — HYDROCODONE BITARTRATE AND ACETAMINOPHEN 5; 325 MG/1; MG/1
1 TABLET ORAL EVERY 4 HOURS PRN
Qty: 11 TABLET | Refills: 0 | Status: SHIPPED | OUTPATIENT
Start: 2024-09-23 | End: 2024-09-28

## 2024-09-23 RX ORDER — LIDOCAINE HYDROCHLORIDE 10 MG/ML
1 INJECTION, SOLUTION EPIDURAL; INFILTRATION; INTRACAUDAL; PERINEURAL
Status: COMPLETED | OUTPATIENT
Start: 2024-09-23 | End: 2024-09-23

## 2024-09-23 RX ORDER — SULFAMETHOXAZOLE AND TRIMETHOPRIM 800; 160 MG/1; MG/1
2 TABLET ORAL 2 TIMES DAILY
Qty: 28 TABLET | Refills: 0 | Status: SHIPPED | OUTPATIENT
Start: 2024-09-23 | End: 2024-09-30

## 2024-09-23 RX ADMIN — LIDOCAINE HYDROCHLORIDE 10 MG: 10 INJECTION, SOLUTION EPIDURAL; INFILTRATION; INTRACAUDAL at 10:09
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
## 2024-10-07 DIAGNOSIS — M47.812 CERVICAL SPONDYLOSIS WITHOUT MYELOPATHY: ICD-10-CM

## 2024-10-07 DIAGNOSIS — M54.17 LUMBOSACRAL RADICULOPATHY: ICD-10-CM

## 2024-10-07 RX ORDER — GABAPENTIN 300 MG/1
300 CAPSULE ORAL 2 TIMES DAILY
Qty: 180 CAPSULE | Refills: 1 | Status: SHIPPED | OUTPATIENT
Start: 2024-10-07

## 2024-12-09 DIAGNOSIS — G89.29 CHRONIC BILATERAL LOW BACK PAIN WITH RIGHT-SIDED SCIATICA: ICD-10-CM

## 2024-12-09 DIAGNOSIS — M54.41 CHRONIC BILATERAL LOW BACK PAIN WITH RIGHT-SIDED SCIATICA: ICD-10-CM

## 2024-12-10 DIAGNOSIS — M48.02 CERVICAL STENOSIS OF SPINE: ICD-10-CM

## 2024-12-10 RX ORDER — TIZANIDINE 4 MG/1
TABLET ORAL
Qty: 40 TABLET | Refills: 0 | Status: SHIPPED | OUTPATIENT
Start: 2024-12-10 | End: 2024-12-11 | Stop reason: ALTCHOICE

## 2024-12-11 RX ORDER — CYCLOBENZAPRINE HCL 10 MG
10 TABLET ORAL 3 TIMES DAILY PRN
Qty: 60 TABLET | Refills: 0 | Status: SHIPPED | OUTPATIENT
Start: 2024-12-11

## 2024-12-12 ENCOUNTER — HOSPITAL ENCOUNTER (EMERGENCY)
Facility: HOSPITAL | Age: 54
Discharge: HOME | End: 2024-12-12
Attending: EMERGENCY MEDICINE
Payer: MEDICAID

## 2024-12-12 VITALS
BODY MASS INDEX: 32.64 KG/M2 | HEIGHT: 62 IN | WEIGHT: 177.38 LBS | RESPIRATION RATE: 20 BRPM | SYSTOLIC BLOOD PRESSURE: 148 MMHG | DIASTOLIC BLOOD PRESSURE: 67 MMHG | OXYGEN SATURATION: 100 % | TEMPERATURE: 98 F | HEART RATE: 88 BPM

## 2024-12-12 DIAGNOSIS — K08.89 PAIN, DENTAL: Primary | ICD-10-CM

## 2024-12-12 DIAGNOSIS — K04.7 DENTAL ABSCESS: ICD-10-CM

## 2024-12-12 PROCEDURE — 25000003 PHARM REV CODE 250: Mod: ER | Performed by: EMERGENCY MEDICINE

## 2024-12-12 PROCEDURE — 99284 EMERGENCY DEPT VISIT MOD MDM: CPT | Mod: ER

## 2024-12-12 RX ORDER — HYDROCODONE BITARTRATE AND ACETAMINOPHEN 5; 325 MG/1; MG/1
1 TABLET ORAL EVERY 4 HOURS PRN
Qty: 11 TABLET | Refills: 0 | Status: SHIPPED | OUTPATIENT
Start: 2024-12-12 | End: 2024-12-17

## 2024-12-12 RX ORDER — HYDROCODONE BITARTRATE AND ACETAMINOPHEN 5; 325 MG/1; MG/1
1 TABLET ORAL
Status: COMPLETED | OUTPATIENT
Start: 2024-12-12 | End: 2024-12-12

## 2024-12-12 RX ORDER — PENICILLIN V POTASSIUM 500 MG/1
500 TABLET, FILM COATED ORAL EVERY 6 HOURS
Qty: 28 TABLET | Refills: 0 | Status: SHIPPED | OUTPATIENT
Start: 2024-12-12 | End: 2024-12-19

## 2024-12-12 RX ADMIN — HYDROCODONE BITARTRATE AND ACETAMINOPHEN 1 TABLET: 5; 325 TABLET ORAL at 07:12
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
## 2024-12-29 ENCOUNTER — HOSPITAL ENCOUNTER (EMERGENCY)
Facility: HOSPITAL | Age: 54
Discharge: HOME | End: 2024-12-29
Attending: EMERGENCY MEDICINE
Payer: MEDICAID

## 2024-12-29 VITALS
WEIGHT: 170.75 LBS | OXYGEN SATURATION: 98 % | SYSTOLIC BLOOD PRESSURE: 143 MMHG | HEIGHT: 62 IN | TEMPERATURE: 98 F | RESPIRATION RATE: 16 BRPM | BODY MASS INDEX: 31.42 KG/M2 | HEART RATE: 102 BPM | DIASTOLIC BLOOD PRESSURE: 77 MMHG

## 2024-12-29 DIAGNOSIS — L08.9 SKIN INFECTION: Primary | ICD-10-CM

## 2024-12-29 PROCEDURE — 99284 EMERGENCY DEPT VISIT MOD MDM: CPT | Mod: ER

## 2024-12-29 RX ORDER — KETOROLAC TROMETHAMINE 10 MG/1
10 TABLET, FILM COATED ORAL EVERY 6 HOURS PRN
Qty: 15 TABLET | Refills: 0 | Status: SHIPPED | OUTPATIENT
Start: 2024-12-29

## 2024-12-29 RX ORDER — MUPIROCIN CALCIUM 20 MG/G
CREAM TOPICAL 3 TIMES DAILY
Qty: 30 G | Refills: 0 | Status: SHIPPED | OUTPATIENT
Start: 2024-12-29

## 2024-12-29 RX ORDER — SULFAMETHOXAZOLE AND TRIMETHOPRIM 800; 160 MG/1; MG/1
1 TABLET ORAL 2 TIMES DAILY
Qty: 14 TABLET | Refills: 0 | Status: SHIPPED | OUTPATIENT
Start: 2024-12-29 | End: 2025-01-05
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
## 2025-01-29 ENCOUNTER — TELEPHONE (OUTPATIENT)
Dept: INTERNAL MEDICINE | Facility: CLINIC | Age: 55
End: 2025-01-29
Payer: MEDICAID

## 2025-01-29 NOTE — TELEPHONE ENCOUNTER
----- Message from Vik sent at 1/29/2025  8:37 AM CST -----  Type:  Needs Medical Advice    Who Called: HOUSTON PAULINO [[id number]]  Symptoms (please be specific):    How long has patient had these symptoms:    Pharmacy name and phone #:    Would the patient rather a call back or a response via [facility]?   Best Call Back Number:  [phone]  Additional Information: Patient called to schedule appt Please call

## 2025-02-04 DIAGNOSIS — M47.812 CERVICAL SPONDYLOSIS WITHOUT MYELOPATHY: ICD-10-CM

## 2025-02-04 DIAGNOSIS — M54.17 LUMBOSACRAL RADICULOPATHY: ICD-10-CM

## 2025-02-04 RX ORDER — GABAPENTIN 600 MG/1
TABLET ORAL
Qty: 90 TABLET | Refills: 0 | Status: SHIPPED | OUTPATIENT
Start: 2025-02-04

## 2025-02-12 ENCOUNTER — HOSPITAL ENCOUNTER (EMERGENCY)
Facility: HOSPITAL | Age: 55
Discharge: HOME | End: 2025-02-12
Attending: EMERGENCY MEDICINE
Payer: MEDICAID

## 2025-02-12 VITALS
HEART RATE: 88 BPM | BODY MASS INDEX: 31.97 KG/M2 | DIASTOLIC BLOOD PRESSURE: 68 MMHG | SYSTOLIC BLOOD PRESSURE: 126 MMHG | HEIGHT: 62 IN | OXYGEN SATURATION: 99 % | TEMPERATURE: 98 F | WEIGHT: 173.75 LBS | RESPIRATION RATE: 18 BRPM

## 2025-02-12 DIAGNOSIS — M54.31 SCIATICA OF RIGHT SIDE: Primary | ICD-10-CM

## 2025-02-12 PROCEDURE — 96372 THER/PROPH/DIAG INJ SC/IM: CPT | Performed by: EMERGENCY MEDICINE

## 2025-02-12 PROCEDURE — 63600175 PHARM REV CODE 636 W HCPCS: Mod: ER | Performed by: EMERGENCY MEDICINE

## 2025-02-12 PROCEDURE — 99284 EMERGENCY DEPT VISIT MOD MDM: CPT | Mod: 25,ER

## 2025-02-12 PROCEDURE — 25000003 PHARM REV CODE 250: Mod: ER | Performed by: EMERGENCY MEDICINE

## 2025-02-12 RX ORDER — TRAMADOL HYDROCHLORIDE 50 MG/1
50 TABLET ORAL EVERY 6 HOURS PRN
Qty: 12 TABLET | Refills: 0 | Status: SHIPPED | OUTPATIENT
Start: 2025-02-12

## 2025-02-12 RX ORDER — DEXAMETHASONE SODIUM PHOSPHATE 4 MG/ML
8 INJECTION, SOLUTION INTRA-ARTICULAR; INTRALESIONAL; INTRAMUSCULAR; INTRAVENOUS; SOFT TISSUE
Status: COMPLETED | OUTPATIENT
Start: 2025-02-12 | End: 2025-02-12

## 2025-02-12 RX ORDER — OXYCODONE HYDROCHLORIDE 5 MG/1
5 TABLET ORAL
Status: COMPLETED | OUTPATIENT
Start: 2025-02-12 | End: 2025-02-12

## 2025-02-12 RX ORDER — CYCLOBENZAPRINE HCL 5 MG
5 TABLET ORAL 3 TIMES DAILY PRN
Qty: 15 TABLET | Refills: 0 | Status: SHIPPED | OUTPATIENT
Start: 2025-02-12 | End: 2025-02-22

## 2025-02-12 RX ADMIN — DEXAMETHASONE SODIUM PHOSPHATE 8 MG: 4 INJECTION, SOLUTION INTRA-ARTICULAR; INTRALESIONAL; INTRAMUSCULAR; INTRAVENOUS; SOFT TISSUE at 05:02

## 2025-02-12 RX ADMIN — OXYCODONE HYDROCHLORIDE 5 MG: 5 TABLET ORAL at 05:02
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
## 2025-03-05 DIAGNOSIS — G89.29 CHRONIC BILATERAL LOW BACK PAIN WITH RIGHT-SIDED SCIATICA: ICD-10-CM

## 2025-03-05 DIAGNOSIS — M54.41 CHRONIC BILATERAL LOW BACK PAIN WITH RIGHT-SIDED SCIATICA: ICD-10-CM

## 2025-03-06 RX ORDER — NAPROXEN 500 MG/1
TABLET ORAL
Qty: 60 TABLET | Refills: 0 | Status: SHIPPED | OUTPATIENT
Start: 2025-03-06

## 2025-03-17 ENCOUNTER — TELEPHONE (OUTPATIENT)
Dept: INTERNAL MEDICINE | Facility: CLINIC | Age: 55
End: 2025-03-17
Payer: MEDICAID

## 2025-03-17 NOTE — TELEPHONE ENCOUNTER
----- Message from Ursula sent at 3/17/2025  9:54 AM CDT -----  Contact: Jael  Type:  Sooner Apoointment RequestCaller is requesting a sooner appointment.  Caller declined first available appointment listed below.  Caller will not accept being placed on the waitlist and is requesting a message be sent to doctor.Name of Caller: Ramon is the first available appointment? NoneSymptoms:  sciatica nerve Would the patient rather a call back or a response via [employer]? Call [facility] Call Back Number: [phone] Information: Need to be seen by a  to get medication.

## 2025-03-18 ENCOUNTER — HOSPITAL ENCOUNTER (EMERGENCY)
Facility: HOSPITAL | Age: 55
Discharge: HOME | End: 2025-03-18
Attending: EMERGENCY MEDICINE
Payer: MEDICAID

## 2025-03-18 VITALS
BODY MASS INDEX: 31.43 KG/M2 | TEMPERATURE: 98 F | RESPIRATION RATE: 16 BRPM | SYSTOLIC BLOOD PRESSURE: 161 MMHG | OXYGEN SATURATION: 100 % | DIASTOLIC BLOOD PRESSURE: 81 MMHG | WEIGHT: 177.38 LBS | HEIGHT: 63 IN | HEART RATE: 88 BPM

## 2025-03-18 DIAGNOSIS — K08.89 PAIN, DENTAL: Primary | ICD-10-CM

## 2025-03-18 DIAGNOSIS — M48.02 CERVICAL STENOSIS OF SPINE: ICD-10-CM

## 2025-03-18 PROCEDURE — 63600175 PHARM REV CODE 636 W HCPCS: Mod: JZ,TB,ER | Performed by: EMERGENCY MEDICINE

## 2025-03-18 PROCEDURE — 25000003 PHARM REV CODE 250: Mod: ER | Performed by: EMERGENCY MEDICINE

## 2025-03-18 PROCEDURE — 96372 THER/PROPH/DIAG INJ SC/IM: CPT | Performed by: EMERGENCY MEDICINE

## 2025-03-18 PROCEDURE — 99284 EMERGENCY DEPT VISIT MOD MDM: CPT | Mod: 25,ER

## 2025-03-18 RX ORDER — AMOXICILLIN AND CLAVULANATE POTASSIUM 875; 125 MG/1; MG/1
1 TABLET, FILM COATED ORAL
Status: COMPLETED | OUTPATIENT
Start: 2025-03-18 | End: 2025-03-18

## 2025-03-18 RX ORDER — KETOROLAC TROMETHAMINE 30 MG/ML
30 INJECTION, SOLUTION INTRAMUSCULAR; INTRAVENOUS
Status: COMPLETED | OUTPATIENT
Start: 2025-03-18 | End: 2025-03-18

## 2025-03-18 RX ORDER — CYCLOBENZAPRINE HCL 10 MG
10 TABLET ORAL 3 TIMES DAILY PRN
Qty: 60 TABLET | Refills: 0 | Status: SHIPPED | OUTPATIENT
Start: 2025-03-18

## 2025-03-18 RX ORDER — AMOXICILLIN AND CLAVULANATE POTASSIUM 875; 125 MG/1; MG/1
1 TABLET, FILM COATED ORAL 2 TIMES DAILY
Qty: 14 TABLET | Refills: 0 | Status: SHIPPED | OUTPATIENT
Start: 2025-03-18

## 2025-03-18 RX ADMIN — KETOROLAC TROMETHAMINE 30 MG: 30 INJECTION, SOLUTION INTRAMUSCULAR; INTRAVENOUS at 02:03

## 2025-03-18 RX ADMIN — AMOXICILLIN AND CLAVULANATE POTASSIUM 1 TABLET: 875; 125 TABLET, FILM COATED ORAL at 02:03

## 2025-03-18 NOTE — ED NOTES
Patient examined, evaluated, and educated on discharge prescriptions and instructions by MD Nikolai. [facility]
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
## 2025-03-31 ENCOUNTER — TELEPHONE (OUTPATIENT)
Dept: INTERNAL MEDICINE | Facility: CLINIC | Age: 55
End: 2025-03-31
Payer: MEDICAID

## 2025-03-31 NOTE — TELEPHONE ENCOUNTER
----- Message from Jesi sent at 3/31/2025 11:10 AM CDT -----  Contact: self  .Type:  Sooner Apoointment RequestCaller is requesting a sooner appointment.  Caller declined first available appointment listed below.  Caller will not accept being placed on the waitlist and is requesting a message be sent to doctor.Name of Caller:.Jael Dyefrankie Berry is the first available appointment?Symptoms:Would the patient rather a call back or a response via [facility]? Call [facility] Call Back Number:[phone] Information: Pt states she is needing a call back to get an gisella scheduled with abraham garcia.

## 2025-04-04 DIAGNOSIS — M54.41 CHRONIC BILATERAL LOW BACK PAIN WITH RIGHT-SIDED SCIATICA: ICD-10-CM

## 2025-04-04 DIAGNOSIS — G89.29 CHRONIC BILATERAL LOW BACK PAIN WITH RIGHT-SIDED SCIATICA: ICD-10-CM

## 2025-04-04 RX ORDER — NAPROXEN 500 MG/1
TABLET ORAL
Qty: 60 TABLET | Refills: 0 | Status: SHIPPED | OUTPATIENT
Start: 2025-04-04

## 2025-04-07 DIAGNOSIS — M48.02 CERVICAL STENOSIS OF SPINE: ICD-10-CM

## 2025-04-08 RX ORDER — CYCLOBENZAPRINE HCL 10 MG
TABLET ORAL
Qty: 60 TABLET | Refills: 0 | Status: SHIPPED | OUTPATIENT
Start: 2025-04-08

## 2025-05-01 ENCOUNTER — TELEPHONE (OUTPATIENT)
Dept: INTERNAL MEDICINE | Facility: CLINIC | Age: 55
End: 2025-05-01
Payer: MEDICAID

## 2025-05-01 DIAGNOSIS — M54.41 CHRONIC BILATERAL LOW BACK PAIN WITH RIGHT-SIDED SCIATICA: ICD-10-CM

## 2025-05-01 DIAGNOSIS — M54.17 LUMBOSACRAL RADICULOPATHY: ICD-10-CM

## 2025-05-01 DIAGNOSIS — G89.29 CHRONIC BILATERAL LOW BACK PAIN WITH RIGHT-SIDED SCIATICA: ICD-10-CM

## 2025-05-01 DIAGNOSIS — M47.812 CERVICAL SPONDYLOSIS WITHOUT MYELOPATHY: ICD-10-CM

## 2025-05-01 RX ORDER — NAPROXEN 500 MG/1
TABLET ORAL
Qty: 60 TABLET | Refills: 0 | Status: SHIPPED | OUTPATIENT
Start: 2025-05-01

## 2025-05-01 RX ORDER — TIZANIDINE 4 MG/1
TABLET ORAL
Qty: 40 TABLET | Refills: 0 | OUTPATIENT
Start: 2025-05-01

## 2025-05-01 RX ORDER — GABAPENTIN 600 MG/1
TABLET ORAL
Qty: 90 TABLET | Refills: 0 | Status: SHIPPED | OUTPATIENT
Start: 2025-05-01 | End: 2025-05-02 | Stop reason: ALTCHOICE

## 2025-05-01 NOTE — TELEPHONE ENCOUNTER
The original prescription was discontinued on 12/11/2024 by Kellen Kwong NP for the following reason: Alternate therapy. Renewing this prescription may not be appropriate.     Patient has an appointment on tomorrow

## 2025-05-01 NOTE — TELEPHONE ENCOUNTER
----- Message from Mary sent at 5/1/2025 10:43 AM CDT -----  Contact: Jael  .Patient is calling to speak with the nurse regarding refills on medication . Reports needing to schedule appt for medication refills. Please give patient a call back at .[phone]

## 2025-05-02 ENCOUNTER — OFFICE VISIT (OUTPATIENT)
Dept: INTERNAL MEDICINE | Facility: CLINIC | Age: 55
End: 2025-05-02
Payer: MEDICAID

## 2025-05-02 DIAGNOSIS — M47.812 CERVICAL SPONDYLOSIS WITHOUT MYELOPATHY: ICD-10-CM

## 2025-05-02 DIAGNOSIS — Z00.00 ROUTINE ADULT HEALTH MAINTENANCE: Primary | ICD-10-CM

## 2025-05-02 DIAGNOSIS — Z12.11 COLON CANCER SCREENING: ICD-10-CM

## 2025-05-02 DIAGNOSIS — M54.17 LUMBOSACRAL RADICULOPATHY: ICD-10-CM

## 2025-05-02 DIAGNOSIS — Z12.31 BREAST CANCER SCREENING BY MAMMOGRAM: ICD-10-CM

## 2025-05-02 DIAGNOSIS — G56.03 BILATERAL CARPAL TUNNEL SYNDROME: ICD-10-CM

## 2025-05-02 DIAGNOSIS — G89.29 CHRONIC BILATERAL LOW BACK PAIN WITH BILATERAL SCIATICA: ICD-10-CM

## 2025-05-02 DIAGNOSIS — F17.200 NICOTINE DEPENDENCE, UNCOMPLICATED, UNSPECIFIED NICOTINE PRODUCT TYPE: ICD-10-CM

## 2025-05-02 DIAGNOSIS — Z11.4 SCREENING FOR HIV WITHOUT PRESENCE OF RISK FACTORS: ICD-10-CM

## 2025-05-02 DIAGNOSIS — M48.02 CERVICAL STENOSIS OF SPINE: ICD-10-CM

## 2025-05-02 DIAGNOSIS — M54.41 CHRONIC BILATERAL LOW BACK PAIN WITH BILATERAL SCIATICA: ICD-10-CM

## 2025-05-02 DIAGNOSIS — M54.42 CHRONIC BILATERAL LOW BACK PAIN WITH BILATERAL SCIATICA: ICD-10-CM

## 2025-05-02 DIAGNOSIS — Z13.220 LIPID SCREENING: ICD-10-CM

## 2025-05-02 DIAGNOSIS — Z72.0 TOBACCO USE: ICD-10-CM

## 2025-05-02 DIAGNOSIS — Z11.59 ENCOUNTER FOR HEPATITIS C SCREENING TEST FOR LOW RISK PATIENT: ICD-10-CM

## 2025-05-02 DIAGNOSIS — E66.811 OBESITY, CLASS I, BMI 30-34.9: ICD-10-CM

## 2025-05-02 DIAGNOSIS — Z13.1 DIABETES MELLITUS SCREENING: ICD-10-CM

## 2025-05-02 PROCEDURE — 3078F DIAST BP <80 MM HG: CPT | Mod: CPTII,,, | Performed by: NURSE PRACTITIONER

## 2025-05-02 PROCEDURE — 1159F MED LIST DOCD IN RCRD: CPT | Mod: CPTII,,, | Performed by: NURSE PRACTITIONER

## 2025-05-02 PROCEDURE — G2211 COMPLEX E/M VISIT ADD ON: HCPCS | Mod: S$PBB,,, | Performed by: NURSE PRACTITIONER

## 2025-05-02 PROCEDURE — 99214 OFFICE O/P EST MOD 30 MIN: CPT | Mod: S$PBB,,, | Performed by: NURSE PRACTITIONER

## 2025-05-02 PROCEDURE — 1160F RVW MEDS BY RX/DR IN RCRD: CPT | Mod: CPTII,,, | Performed by: NURSE PRACTITIONER

## 2025-05-02 PROCEDURE — 3074F SYST BP LT 130 MM HG: CPT | Mod: CPTII,,, | Performed by: NURSE PRACTITIONER

## 2025-05-02 PROCEDURE — 99214 OFFICE O/P EST MOD 30 MIN: CPT | Mod: PBBFAC,PO | Performed by: NURSE PRACTITIONER

## 2025-05-02 PROCEDURE — 3008F BODY MASS INDEX DOCD: CPT | Mod: CPTII,,, | Performed by: NURSE PRACTITIONER

## 2025-05-02 PROCEDURE — 99999 PR PBB SHADOW E&M-EST. PATIENT-LVL IV: CPT | Mod: PBBFAC,,, | Performed by: NURSE PRACTITIONER

## 2025-05-02 RX ORDER — TIZANIDINE 4 MG/1
TABLET ORAL
COMMUNITY
Start: 2025-04-04 | End: 2025-05-02 | Stop reason: SDUPTHER

## 2025-05-02 RX ORDER — CYCLOBENZAPRINE HCL 10 MG
10 TABLET ORAL NIGHTLY PRN
Qty: 60 TABLET | Refills: 5 | Status: SHIPPED | OUTPATIENT
Start: 2025-05-02

## 2025-05-02 RX ORDER — PREGABALIN 75 MG/1
75 CAPSULE ORAL 2 TIMES DAILY
Qty: 60 CAPSULE | Refills: 6 | Status: SHIPPED | OUTPATIENT
Start: 2025-05-02

## 2025-05-02 RX ORDER — TIZANIDINE 4 MG/1
4 TABLET ORAL 2 TIMES DAILY PRN
Qty: 60 TABLET | Refills: 1 | Status: SHIPPED | OUTPATIENT
Start: 2025-05-02

## 2025-05-02 RX ORDER — MELOXICAM 15 MG/1
15 TABLET ORAL
COMMUNITY
Start: 2025-05-01

## 2025-05-02 NOTE — PROGRESS NOTES
Subjective:       Patient ID: Jael Olguin is a 54 y.o. female.    Chief Complaint: Medication Refill      History of Present Illness    CHIEF COMPLAINT:  - Ms. Olguin presents for a routine follow-up visit and to discuss medication management, including a potential change from Gabapentin to Lyrica.    HPI:  Ms. Olguin reports ongoing issues with her right hand, describing severe pain. She has a history of carpal tunnel, for which surgery was scheduled about 1.5 to 2 years ago but postponed due to COVID-19 infection. Plans to reschedule. Ms. Olguin has sciatic nerve pain, which affects her ability to walk consistently. In the past few weeks, her sciatic pain has been improving, allowing her to walk more, but it has flared up again in the last couple of days.    Ms. Olguin is currently taking Gabapentin 600mg and 300mg daily for pain management. She expresses interest in switching to Lyrica, as recommended by a friend who found it helpful for similar symptoms.     Ms. Olguin also uses Meloxicam and Naproxen, alternating for pain management. She reports difficulty with Flexeril causing daytime drowsiness, leading to a change in her medication regimen. She now takes Zanaflex during the day and Flexeril at night to manage her symptoms.       Eye exam up to date, plans to schedule in June.   Dental exam due, on waiting list.   Walking for exercise.   Plans to schedule pap smear.     Problem List[1]      Past Surgical History:   Procedure Laterality Date    EPIDURAL STEROID INJECTION INTO CERVICAL SPINE N/A 1/14/2021    Procedure: C6/7 IL MARTY;  Surgeon: Chris Rubin MD;  Location: [facility];  Service: Pain Management;  Laterality: N/A;    WISDOM TOOTH EXTRACTION         Current Medications[2]    Review of Systems   Constitutional:  Negative for appetite change, chills, fatigue and fever.   Eyes:  Negative for visual disturbance.   Respiratory:  Negative for cough and  "shortness of breath.    Cardiovascular:  Negative for chest pain and palpitations.   Gastrointestinal:  Negative for abdominal pain, diarrhea, nausea and vomiting.   Musculoskeletal:  Positive for arthralgias, back pain and myalgias.   Neurological:  Negative for dizziness, light-headedness and headaches.   Psychiatric/Behavioral:  Negative for dysphoric mood. The patient is not nervous/anxious.        Objective:   /60 (BP Location: Left arm, Patient Position: Sitting)   Pulse 96   Temp 96 °F (35.6 °C) (Tympanic)   Ht 5' 3" (1.6 m)   Wt 74.6 kg (164 lb 7.4 oz)   LMP 05/02/2025   SpO2 96%   BMI 29.13 kg/m²      Physical Exam  Constitutional:       General: She is not in acute distress.     Appearance: Normal appearance. She is not ill-appearing or diaphoretic.   HENT:      Head: Normocephalic.   Eyes:      General:         Right eye: No discharge.         Left eye: No discharge.      Extraocular Movements: Extraocular movements intact.      Conjunctiva/sclera: Conjunctivae normal.   Cardiovascular:      Rate and Rhythm: Normal rate and regular rhythm.      Pulses: Normal pulses.      Heart sounds: Normal heart sounds. No murmur heard.     No friction rub. No gallop.   Pulmonary:      Effort: Pulmonary effort is normal. No respiratory distress.      Breath sounds: Normal breath sounds. No wheezing.   Abdominal:      Palpations: Abdomen is soft.      Tenderness: There is no abdominal tenderness. There is no guarding.   Musculoskeletal:      Lumbar back: Tenderness (bilateral SI and paraspinal muscle areas) present. No bony tenderness. Decreased range of motion.   Skin:     General: Skin is warm and dry.      Coloration: Skin is not pale.      Findings: No erythema.   Neurological:      Mental Status: She is alert and oriented to person, place, and time.   Psychiatric:         Mood and Affect: Mood normal.         Behavior: Behavior normal.         Assessment & Plan     Problem List Items Addressed This " Visit          Neuro    Lumbosacral radiculopathy    Relevant Medications    pregabalin (LYRICA) 75 MG capsule    Cervical spondylosis without myelopathy    Relevant Medications    pregabalin (LYRICA) 75 MG capsule    Bilateral carpal tunnel syndrome    Current Assessment & Plan   Needs to get rescheduled with Dr Devries for possible surgery. Supposed to have surgery in 2023, but had covid so never rescheduled.             Endocrine    Obesity, Class I, BMI 30-34.9    Current Assessment & Plan   Counseled on importance of diet and exercise in order to improve overall quality of life, and reduce risk of future comorbidities.              Orthopedic    Chronic bilateral low back pain with bilateral sciatica    Relevant Medications    pregabalin (LYRICA) 75 MG capsule    cyclobenzaprine (FLEXERIL) 10 MG tablet    tiZANidine (ZANAFLEX) 4 MG tablet       Other    Tobacco use    Current Assessment & Plan   Counseled on the importance of smoke cessation in order to improve overall quality of life and reduce risk of future comorbidities. Declined smoke cessation program.   LDCT ordered.          Relevant Orders    CT Chest Lung Screening Low Dose     Other Visit Diagnoses         Routine adult health maintenance    -  Primary    Relevant Orders    CBC Auto Differential    Comprehensive Metabolic Panel    Lipid Panel      Lipid screening        Relevant Orders    Lipid Panel      Breast cancer screening by mammogram        Relevant Orders    Mammo Digital Screening Bilat w/ Fredo (XPD)      Diabetes mellitus screening        Relevant Orders    Hemoglobin A1C      Encounter for hepatitis C screening test for low risk patient        Relevant Orders    Hepatitis C Antibody      Screening for HIV without presence of risk factors        Relevant Orders    HIV 1/2 Ag/Ab (4th Gen)      Colon cancer screening        Relevant Orders    Ambulatory referral/consult to Endo Procedure       Nicotine dependence, uncomplicated,  "unspecified nicotine product type        Relevant Orders    CT Chest Lung Screening Low Dose      Cervical stenosis of spine        Relevant Medications    cyclobenzaprine (FLEXERIL) 10 MG tablet    tiZANidine (ZANAFLEX) 4 MG tablet            Assessment & Plan    MEDICAL DECISION MAKING:  - Reviewed medication regimen, considering switch from Gabapentin to Lyrica for pain management.  - Assessed need for routine health screenings and preventive care.  - Evaluated smoking status and considered recommendation for smoking cessation program.    ACTION ITEMS/LIFESTYLE:  - Ms. Olguin to continue walking for exercise to help with sciatic nerve pain and weight loss.    MEDICATIONS:  - Started Lyrica 75 mg daily at night, with instructions to increase to twice daily after a few days if tolerated.  Discontinue gabapentin  - Continued Flexeril at night and Zanaflex during the day for muscle relaxation.  - Continued Meloxicam.  - Continued Naproxen, not in same 24 hours as meloxicam    ORDERS:  - Ordered CBC, CMP, electrolytes, Hepatitis C screening, and A1C.  - Ordered mammogram.  - Ordered colonoscopy.  - Ordered lung cancer screening.    REFERRALS:  - Consider carpal tunnel follow-up with ortho provider    FOLLOW UP:  - Follow up to schedule lab work and mammogram for Monday.  - Contact office if experiencing any issues with new Lyrica prescription.          Follow up if symptoms worsen or fail to improve.    Visit today included increased complexity associated with the care of the episodic problem  addressed and managing the longitudinal care of the patient due to the serious and/or complex managed problem(s) .        Portions of this note may have been created with voice recognition software. Occasional "wrong-word" or "sound-a-like" substitutions may have occurred due to the inherent limitations of voice recognition software. Please, read the note carefully and recognize, using context, where substitutions have " occurred.       This note was generated with the assistance of ambient listening technology. Verbal consent was obtained by the patient and accompanying visitor(s) for the recording of patient appointment to facilitate this note. I attest to having reviewed and edited the generated note for accuracy, though some syntax or spelling errors may persist. Please contact the author of this note for any clarification.            [1]   Patient Active Problem List  Diagnosis    Depression    Obesity, Class I, BMI 30-34.9    Tobacco use    Cervical radiculopathy    Chronic bilateral low back pain with bilateral sciatica    Chronic neck pain    Lumbosacral radiculopathy    Cervical spondylosis without myelopathy    Bilateral carpal tunnel syndrome    Opioid dependence   [2]   Current Outpatient Medications:     meloxicam (MOBIC) 15 MG tablet, Take 15 mg by mouth., Disp: , Rfl:     cyclobenzaprine (FLEXERIL) 10 MG tablet, Take 1 tablet (10 mg total) by mouth nightly as needed for Muscle spasms., Disp: 60 tablet, Rfl: 5    mupirocin (BACTROBAN) 2 % ointment, APPLY TOPICALLY 3 TIMES DAILY., Disp: 22 g, Rfl: 0    mupirocin calcium 2% (BACTROBAN) 2 % cream, Apply topically 3 (three) times daily., Disp: 30 g, Rfl: 0    naproxen (NAPROSYN) 500 MG tablet, take 1 tablet (500 milligram total) by mouth 2 times daily as needed (pain)., Disp: 60 tablet, Rfl: 0    pregabalin (LYRICA) 75 MG capsule, Take 1 capsule (75 mg total) by mouth 2 (two) times daily., Disp: 60 capsule, Rfl: 6    tiZANidine (ZANAFLEX) 4 MG tablet, Take 1 tablet (4 mg total) by mouth 2 (two) times daily as needed., Disp: 60 tablet, Rfl: 1

## 2025-05-02 NOTE — ASSESSMENT & PLAN NOTE
Needs to get rescheduled with Dr Devries for possible surgery. Supposed to have surgery in 2023, but had covid so never rescheduled.

## 2025-05-02 NOTE — ASSESSMENT & PLAN NOTE
Counseled on the importance of smoke cessation in order to improve overall quality of life and reduce risk of future comorbidities. Declined smoke cessation program.   LDCT ordered.

## 2025-05-07 VITALS
BODY MASS INDEX: 29.14 KG/M2 | WEIGHT: 164.44 LBS | DIASTOLIC BLOOD PRESSURE: 60 MMHG | HEART RATE: 96 BPM | TEMPERATURE: 96 F | HEIGHT: 63 IN | OXYGEN SATURATION: 96 % | SYSTOLIC BLOOD PRESSURE: 106 MMHG

## 2025-05-12 ENCOUNTER — HOSPITAL ENCOUNTER (EMERGENCY)
Facility: HOSPITAL | Age: 55
Discharge: HOME | End: 2025-05-12
Attending: EMERGENCY MEDICINE
Payer: MEDICAID

## 2025-05-12 VITALS
HEART RATE: 84 BPM | SYSTOLIC BLOOD PRESSURE: 152 MMHG | TEMPERATURE: 98 F | OXYGEN SATURATION: 99 % | BODY MASS INDEX: 30.77 KG/M2 | DIASTOLIC BLOOD PRESSURE: 75 MMHG | RESPIRATION RATE: 18 BRPM | WEIGHT: 173.75 LBS

## 2025-05-12 VITALS
TEMPERATURE: 98 F | BODY MASS INDEX: 30.25 KG/M2 | RESPIRATION RATE: 18 BRPM | SYSTOLIC BLOOD PRESSURE: 136 MMHG | WEIGHT: 170.75 LBS | DIASTOLIC BLOOD PRESSURE: 76 MMHG | HEART RATE: 98 BPM | OXYGEN SATURATION: 100 % | HEIGHT: 63 IN

## 2025-05-12 DIAGNOSIS — K08.89 TOOTHACHE: Primary | ICD-10-CM

## 2025-05-12 PROCEDURE — 99284 EMERGENCY DEPT VISIT MOD MDM: CPT | Mod: ER

## 2025-05-12 PROCEDURE — 25000003 PHARM REV CODE 250: Mod: ER | Performed by: EMERGENCY MEDICINE

## 2025-05-12 PROCEDURE — 99284 EMERGENCY DEPT VISIT MOD MDM: CPT | Mod: 27,ER

## 2025-05-12 RX ORDER — HYDROCODONE BITARTRATE AND ACETAMINOPHEN 5; 325 MG/1; MG/1
1 TABLET ORAL EVERY 6 HOURS PRN
Qty: 6 TABLET | Refills: 0 | Status: SHIPPED | OUTPATIENT
Start: 2025-05-12

## 2025-05-12 RX ORDER — CLINDAMYCIN HYDROCHLORIDE 300 MG/1
300 CAPSULE ORAL 4 TIMES DAILY
Qty: 40 CAPSULE | Refills: 0 | Status: SHIPPED | OUTPATIENT
Start: 2025-05-12 | End: 2025-05-22

## 2025-05-12 RX ORDER — GABAPENTIN 600 MG/1
600 TABLET ORAL DAILY
COMMUNITY
Start: 2025-05-02

## 2025-05-12 RX ORDER — PENICILLIN V POTASSIUM 500 MG/1
500 TABLET, FILM COATED ORAL EVERY 6 HOURS
Qty: 28 TABLET | Refills: 0 | Status: SHIPPED | OUTPATIENT
Start: 2025-05-12 | End: 2025-05-19

## 2025-05-12 RX ORDER — NAPROXEN 500 MG/1
250 TABLET ORAL 2 TIMES DAILY WITH MEALS
Qty: 60 TABLET | Refills: 0 | Status: SHIPPED | OUTPATIENT
Start: 2025-05-12

## 2025-05-12 RX ORDER — HYDROCODONE BITARTRATE AND ACETAMINOPHEN 5; 325 MG/1; MG/1
1 TABLET ORAL
Refills: 0 | Status: COMPLETED | OUTPATIENT
Start: 2025-05-12 | End: 2025-05-12

## 2025-05-12 RX ORDER — CLINDAMYCIN HYDROCHLORIDE 150 MG/1
300 CAPSULE ORAL
Status: COMPLETED | OUTPATIENT
Start: 2025-05-12 | End: 2025-05-12

## 2025-05-12 RX ADMIN — CLINDAMYCIN HYDROCHLORIDE 300 MG: 150 CAPSULE ORAL at 11:05

## 2025-05-12 RX ADMIN — HYDROCODONE BITARTRATE AND ACETAMINOPHEN 1 TABLET: 5; 325 TABLET ORAL at 11:05
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
## 2025-05-28 DIAGNOSIS — M54.17 LUMBOSACRAL RADICULOPATHY: ICD-10-CM

## 2025-05-28 DIAGNOSIS — M47.812 CERVICAL SPONDYLOSIS WITHOUT MYELOPATHY: ICD-10-CM

## 2025-05-28 DIAGNOSIS — G89.29 CHRONIC BILATERAL LOW BACK PAIN WITH BILATERAL SCIATICA: ICD-10-CM

## 2025-05-28 DIAGNOSIS — M54.42 CHRONIC BILATERAL LOW BACK PAIN WITH BILATERAL SCIATICA: ICD-10-CM

## 2025-05-28 DIAGNOSIS — M54.41 CHRONIC BILATERAL LOW BACK PAIN WITH BILATERAL SCIATICA: ICD-10-CM

## 2025-05-28 RX ORDER — PREGABALIN 75 MG/1
75 CAPSULE ORAL 2 TIMES DAILY
Qty: 60 CAPSULE | Refills: 6 | Status: SHIPPED | OUTPATIENT
Start: 2025-05-28

## 2025-05-28 NOTE — TELEPHONE ENCOUNTER
Copied from CRM #[id number]. Topic: Medications - Medication Refill  >> May 28, 2025 10:03 AM Ursula wrote:  Type:  RX Refill Request    Who Called: Jael Olguin  Refill or New Rx:Refill  RX Name and Strength: pregabalin (LYRICA) 75 MG capsule  How is the patient currently taking it? (ex. 1XDay): 1 tablet 2x day  Is this a 30 day or 90 day RX: 60 tablets  Preferred Pharmacy with phone number:  [facility] [place] - [address]  [address]  [address]  [place] [address]  Phone: [phone] Fax: [phone]  Local or Mail Order:Local  Ordering Provider: XAVIER Suárez  Would the patient rather a call back or a response via [facility]? Call back  Best Call Back Number:[phone]  Additional Information: Please call back, questions about the dosage on Rx.

## 2025-06-30 DIAGNOSIS — M48.02 CERVICAL STENOSIS OF SPINE: ICD-10-CM

## 2025-06-30 DIAGNOSIS — M54.41 CHRONIC BILATERAL LOW BACK PAIN WITH BILATERAL SCIATICA: ICD-10-CM

## 2025-06-30 DIAGNOSIS — G89.29 CHRONIC BILATERAL LOW BACK PAIN WITH BILATERAL SCIATICA: ICD-10-CM

## 2025-06-30 DIAGNOSIS — M54.42 CHRONIC BILATERAL LOW BACK PAIN WITH BILATERAL SCIATICA: ICD-10-CM

## 2025-06-30 RX ORDER — TIZANIDINE 4 MG/1
TABLET ORAL
Qty: 60 TABLET | Refills: 0 | Status: SHIPPED | OUTPATIENT
Start: 2025-06-30

## 2025-06-30 RX ORDER — MELOXICAM 15 MG/1
TABLET ORAL
Qty: 30 TABLET | Refills: 0 | Status: SHIPPED | OUTPATIENT
Start: 2025-06-30

## 2025-07-02 ENCOUNTER — PATIENT OUTREACH (OUTPATIENT)
Dept: ADMINISTRATIVE | Facility: HOSPITAL | Age: 55
End: 2025-07-02
Payer: MEDICAID

## 2025-07-09 RX ORDER — NAPROXEN 500 MG/1
TABLET ORAL
Qty: 60 TABLET | Refills: 0 | Status: SHIPPED | OUTPATIENT
Start: 2025-07-09

## 2025-07-28 DIAGNOSIS — M54.41 CHRONIC BILATERAL LOW BACK PAIN WITH BILATERAL SCIATICA: ICD-10-CM

## 2025-07-28 DIAGNOSIS — M48.02 CERVICAL STENOSIS OF SPINE: ICD-10-CM

## 2025-07-28 DIAGNOSIS — M54.42 CHRONIC BILATERAL LOW BACK PAIN WITH BILATERAL SCIATICA: ICD-10-CM

## 2025-07-28 DIAGNOSIS — G89.29 CHRONIC BILATERAL LOW BACK PAIN WITH BILATERAL SCIATICA: ICD-10-CM

## 2025-07-28 RX ORDER — TIZANIDINE 4 MG/1
4 TABLET ORAL 2 TIMES DAILY PRN
Qty: 60 TABLET | Refills: 0 | Status: SHIPPED | OUTPATIENT
Start: 2025-07-28

## 2025-07-28 RX ORDER — GABAPENTIN 600 MG/1
TABLET ORAL
Qty: 90 TABLET | Refills: 0 | OUTPATIENT
Start: 2025-07-28

## 2025-07-28 RX ORDER — MELOXICAM 15 MG/1
TABLET ORAL
Qty: 30 TABLET | Refills: 0 | Status: SHIPPED | OUTPATIENT
Start: 2025-07-28

## 2025-08-25 DIAGNOSIS — M54.41 CHRONIC BILATERAL LOW BACK PAIN WITH BILATERAL SCIATICA: ICD-10-CM

## 2025-08-25 DIAGNOSIS — M48.02 CERVICAL STENOSIS OF SPINE: ICD-10-CM

## 2025-08-25 DIAGNOSIS — M54.42 CHRONIC BILATERAL LOW BACK PAIN WITH BILATERAL SCIATICA: ICD-10-CM

## 2025-08-25 DIAGNOSIS — G89.29 CHRONIC BILATERAL LOW BACK PAIN WITH BILATERAL SCIATICA: ICD-10-CM

## 2025-08-25 RX ORDER — NAPROXEN 500 MG/1
TABLET ORAL
Qty: 60 TABLET | Refills: 0 | Status: SHIPPED | OUTPATIENT
Start: 2025-08-25

## 2025-08-25 RX ORDER — TIZANIDINE 4 MG/1
4 TABLET ORAL 2 TIMES DAILY
Qty: 60 TABLET | Refills: 0 | Status: SHIPPED | OUTPATIENT
Start: 2025-08-25

## 2025-08-25 RX ORDER — MELOXICAM 15 MG/1
TABLET ORAL
Qty: 30 TABLET | Refills: 0 | Status: SHIPPED | OUTPATIENT
Start: 2025-08-25

## 2025-08-26 ENCOUNTER — PATIENT OUTREACH (OUTPATIENT)
Dept: ADMINISTRATIVE | Facility: HOSPITAL | Age: 55
End: 2025-08-26
Payer: MEDICAID

## 2025-09-02 ENCOUNTER — PATIENT OUTREACH (OUTPATIENT)
Dept: ADMINISTRATIVE | Facility: HOSPITAL | Age: 55
End: 2025-09-02
Payer: MEDICAID